# Patient Record
Sex: MALE | Race: WHITE | NOT HISPANIC OR LATINO | Employment: FULL TIME | URBAN - METROPOLITAN AREA
[De-identification: names, ages, dates, MRNs, and addresses within clinical notes are randomized per-mention and may not be internally consistent; named-entity substitution may affect disease eponyms.]

---

## 2018-05-29 ENCOUNTER — HOSPITAL ENCOUNTER (EMERGENCY)
Facility: MEDICAL CENTER | Age: 58
End: 2018-05-29
Attending: EMERGENCY MEDICINE
Payer: COMMERCIAL

## 2018-05-29 VITALS
OXYGEN SATURATION: 80 % | SYSTOLIC BLOOD PRESSURE: 130 MMHG | HEART RATE: 49 BPM | HEIGHT: 70 IN | TEMPERATURE: 98.8 F | RESPIRATION RATE: 15 BRPM | BODY MASS INDEX: 23.62 KG/M2 | DIASTOLIC BLOOD PRESSURE: 82 MMHG | WEIGHT: 165 LBS

## 2018-05-29 DIAGNOSIS — F10.920 ALCOHOLIC INTOXICATION WITHOUT COMPLICATION (HCC): ICD-10-CM

## 2018-05-29 LAB — POC BREATHALIZER: 0.29 PERCENT (ref 0–0.01)

## 2018-05-29 PROCEDURE — 302970 POC BREATHALIZER: Performed by: EMERGENCY MEDICINE

## 2018-05-29 PROCEDURE — 304561 HCHG STAT O2

## 2018-05-29 PROCEDURE — 99285 EMERGENCY DEPT VISIT HI MDM: CPT

## 2018-05-29 PROCEDURE — 700111 HCHG RX REV CODE 636 W/ 250 OVERRIDE (IP): Performed by: EMERGENCY MEDICINE

## 2018-05-29 PROCEDURE — 96374 THER/PROPH/DIAG INJ IV PUSH: CPT

## 2018-05-29 PROCEDURE — 302970 POC BREATHALIZER

## 2018-05-29 RX ORDER — ONDANSETRON 2 MG/ML
4 INJECTION INTRAMUSCULAR; INTRAVENOUS ONCE
Status: COMPLETED | OUTPATIENT
Start: 2018-05-29 | End: 2018-05-29

## 2018-05-29 RX ADMIN — ONDANSETRON 4 MG: 2 INJECTION INTRAMUSCULAR; INTRAVENOUS at 18:47

## 2018-05-29 ASSESSMENT — PAIN SCALES - GENERAL: PAINLEVEL_OUTOF10: ASSUMED PAIN PRESENT

## 2018-05-29 NOTE — ED TRIAGE NOTES
"Pt BIB [facility] with c/c of ETOH. Pt was found sleeping on a bench and EMS was called. Pt slow to respond to questions but oriented to date and place. Pt reporting he is from [place]. Pt denies medical complaints at this time. Pt reporting he normally doesn't consume large amounts of alcohol but \"Lost control\" pt stating \"last night I got fired\"  "

## 2018-05-29 NOTE — ED NOTES
Pt again attempting to get out of bed. Pt requesting water. Pt given water, pt again educated on fall risk precautions.

## 2018-05-29 NOTE — ED PROVIDER NOTES
"ED Provider Note    Scribed for Crystal Díaz M.D. by Alyssa Easton. 5/29/2018, 2:48 PM.    Means of arrival: ambulance  History obtained from: patient  History limited by: patient's altered level of consciousness(alcohol intoxication)    CHIEF COMPLAINT  Chief Complaint   Patient presents with   • Alcohol Intoxication       HPI  Artem Weinstein is a 57 y.o. male who presents to the Emergency Department for evaluation after being found intoxicated laying on a bench by EMS just prior to arrival in the ED. They decided to bring him to the ED secondary to him being slow to respond to questions. No medical complaints at this time. He reported to EMS and nursing staff \"I don't usually drink this much.\"  Denies suicidal ideation.    History limited secondary to patient's altered level of consciousness (alcohol intoxication)    REVIEW OF SYSTEMS  Pertinent positives include EtOH intoxication. Pertinent negatives include no medical complaints.  ROS limited secondary to patient's altered level of consciousness (EtOH intoxication)    PAST MEDICAL HISTORY   no pertinent past medical history    SURGICAL HISTORY  patient denies any surgical history    SOCIAL HISTORY  States that he does not drink on a regular basis    FAMILY HISTORY  None noted    CURRENT MEDICATIONS  No current facility-administered medications on file prior to encounter.      No current outpatient prescriptions on file prior to encounter.       ALLERGIES  No Known Allergies    PHYSICAL EXAM  VITAL SIGNS: /82   Pulse 95   Temp 37.1 °C (98.8 °F)   Resp 15   Ht 1.778 m (5' 10\")   Wt 74.8 kg (165 lb)   SpO2 95%   BMI 23.68 kg/m²     Constitutional: Alert in no apparent distress.  Intoxicated  HENT: No signs of trauma, Bilateral external ears normal, Nose normal.   Eyes: Pupils are equal and reactive, Conjunctiva normal, Non-icteric.   Neck: Normal range of motion, No tenderness, Supple, No stridor.   Cardiovascular: Regular rate and rhythm, " no murmurs.   Thorax & Lungs: Normal breath sounds, No respiratory distress, No wheezing, No chest tenderness.   Abdomen: Bowel sounds normal, Soft, No tenderness, No masses, No peritoneal signs.  Skin: Warm, Dry, No erythema, No rash.   Musculoskeletal:  No major deformities noted.   Neurologic: Alert, but somnolent easily arousable intoxicated no focal deficits    COURSE & MEDICAL DECISION MAKING  Pertinent Labs & Imaging studies reviewed. (See chart for details)    2:48 PM - Patient seen and examined at bedside. Patient is currently intoxicated. Will re-evaluate following some time for allowed sobriety. He has no medical complaints at this time and has normal vital signs.    6:40 PM I re-evaluated patient at bedside, he is much more awake and alert at this time. Reporting that he has been sober for the last year, until this evening, when he and his girlfriend ended their relationship. Patient denies suicidal thoughts at this time, and would like to go home. He is currently experiencing some nausea and vomiting, so 4mg Zofran will be administered prior to discharge.     The patient will return for new or worsening symptoms and is stable at the time of discharge. Patient was given return precautions. Patient and/or family member verbalizes understanding and will comply.    DISPOSITION:  Patient will be discharged home in stable condition.    FOLLOW UP:  [facility], Emergency Dept  [address] [address] [phone]    Return for worsening symptoms, vomiting or other concerns      OUTPATIENT MEDICATIONS:  There are no discharge medications for this patient.        FINAL IMPRESSION  1. Alcoholic intoxication without complication (HCC)         This dictation has been created using voice recognition software and/or scribes. The accuracy of the dictation is limited by the abilities of the software and the expertise of the scribes. I expect there may be some errors of grammar and  possibly content. I made every attempt to manually correct the errors within my dictation. However, errors related to voice recognition software and/or scribes may still exist and should be interpreted within the appropriate context.     I, Alyssa Easton (Scribe), am scribing for, and in the presence of, Crystal Díaz M.D..    Electronically signed by: Alyssa Easton (Scribe), 5/29/2018    ICrystal M.D. personally performed the services described in this documentation, as scribed by Alyssa Easton in my presence, and it is both accurate and complete.    The note accurately reflects work and decisions made by me.  Crystal Díaz  5/29/2018  9:04 PM

## 2018-05-29 NOTE — ED TRIAGE NOTES
ERP to bedside. [place] fall risk assessment completed and in chart. Interventions in place. Pt educated on fall risk many times.

## 2018-05-30 ENCOUNTER — HOSPITAL ENCOUNTER (EMERGENCY)
Facility: MEDICAL CENTER | Age: 58
End: 2018-05-30
Attending: EMERGENCY MEDICINE
Payer: COMMERCIAL

## 2018-05-30 VITALS
HEART RATE: 90 BPM | SYSTOLIC BLOOD PRESSURE: 142 MMHG | TEMPERATURE: 98.3 F | WEIGHT: 177.47 LBS | HEIGHT: 69 IN | OXYGEN SATURATION: 94 % | BODY MASS INDEX: 26.29 KG/M2 | RESPIRATION RATE: 21 BRPM | DIASTOLIC BLOOD PRESSURE: 70 MMHG

## 2018-05-30 DIAGNOSIS — R11.2 NON-INTRACTABLE VOMITING WITH NAUSEA, UNSPECIFIED VOMITING TYPE: ICD-10-CM

## 2018-05-30 DIAGNOSIS — K29.20 ACUTE ALCOHOLIC GASTRITIS WITHOUT HEMORRHAGE: ICD-10-CM

## 2018-05-30 LAB
ALBUMIN SERPL BCP-MCNC: 4.4 G/DL (ref 3.2–4.9)
ALBUMIN/GLOB SERPL: 1.9 G/DL
ALP SERPL-CCNC: 93 U/L (ref 30–99)
ALT SERPL-CCNC: 183 U/L (ref 2–50)
ANION GAP SERPL CALC-SCNC: 12 MMOL/L (ref 0–11.9)
AST SERPL-CCNC: 194 U/L (ref 12–45)
BASOPHILS # BLD AUTO: 0.5 % (ref 0–1.8)
BASOPHILS # BLD: 0.06 K/UL (ref 0–0.12)
BILIRUB SERPL-MCNC: 0.6 MG/DL (ref 0.1–1.5)
BUN SERPL-MCNC: 18 MG/DL (ref 8–22)
CALCIUM SERPL-MCNC: 8.6 MG/DL (ref 8.5–10.5)
CHLORIDE SERPL-SCNC: 94 MMOL/L (ref 96–112)
CO2 SERPL-SCNC: 22 MMOL/L (ref 20–33)
CREAT SERPL-MCNC: 0.89 MG/DL (ref 0.5–1.4)
EKG IMPRESSION: NORMAL
EOSINOPHIL # BLD AUTO: 0.03 K/UL (ref 0–0.51)
EOSINOPHIL NFR BLD: 0.3 % (ref 0–6.9)
ERYTHROCYTE [DISTWIDTH] IN BLOOD BY AUTOMATED COUNT: 38.5 FL (ref 35.9–50)
ETHANOL BLD-MCNC: 0.17 G/DL
GLOBULIN SER CALC-MCNC: 2.3 G/DL (ref 1.9–3.5)
GLUCOSE SERPL-MCNC: 122 MG/DL (ref 65–99)
HCT VFR BLD AUTO: 46.8 % (ref 42–52)
HGB BLD-MCNC: 16.2 G/DL (ref 14–18)
IMM GRANULOCYTES # BLD AUTO: 0.03 K/UL (ref 0–0.11)
IMM GRANULOCYTES NFR BLD AUTO: 0.3 % (ref 0–0.9)
LIPASE SERPL-CCNC: 30 U/L (ref 11–82)
LYMPHOCYTES # BLD AUTO: 1.89 K/UL (ref 1–4.8)
LYMPHOCYTES NFR BLD: 16.5 % (ref 22–41)
MAGNESIUM SERPL-MCNC: 1.6 MG/DL (ref 1.5–2.5)
MCH RBC QN AUTO: 29.5 PG (ref 27–33)
MCHC RBC AUTO-ENTMCNC: 34.6 G/DL (ref 33.7–35.3)
MCV RBC AUTO: 85.2 FL (ref 81.4–97.8)
MONOCYTES # BLD AUTO: 0.97 K/UL (ref 0–0.85)
MONOCYTES NFR BLD AUTO: 8.5 % (ref 0–13.4)
NEUTROPHILS # BLD AUTO: 8.46 K/UL (ref 1.82–7.42)
NEUTROPHILS NFR BLD: 73.9 % (ref 44–72)
NRBC # BLD AUTO: 0 K/UL
NRBC BLD-RTO: 0 /100 WBC
PHOSPHATE SERPL-MCNC: 2.7 MG/DL (ref 2.5–4.5)
PLATELET # BLD AUTO: 187 K/UL (ref 164–446)
PMV BLD AUTO: 9.4 FL (ref 9–12.9)
POTASSIUM SERPL-SCNC: 3.8 MMOL/L (ref 3.6–5.5)
PROT SERPL-MCNC: 6.7 G/DL (ref 6–8.2)
RBC # BLD AUTO: 5.49 M/UL (ref 4.7–6.1)
SODIUM SERPL-SCNC: 128 MMOL/L (ref 135–145)
WBC # BLD AUTO: 11.4 K/UL (ref 4.8–10.8)

## 2018-05-30 PROCEDURE — 304561 HCHG STAT O2

## 2018-05-30 PROCEDURE — 96376 TX/PRO/DX INJ SAME DRUG ADON: CPT

## 2018-05-30 PROCEDURE — 93005 ELECTROCARDIOGRAM TRACING: CPT | Performed by: EMERGENCY MEDICINE

## 2018-05-30 PROCEDURE — 36415 COLL VENOUS BLD VENIPUNCTURE: CPT

## 2018-05-30 PROCEDURE — 85025 COMPLETE CBC W/AUTO DIFF WBC: CPT

## 2018-05-30 PROCEDURE — 700105 HCHG RX REV CODE 258: Performed by: EMERGENCY MEDICINE

## 2018-05-30 PROCEDURE — 83735 ASSAY OF MAGNESIUM: CPT

## 2018-05-30 PROCEDURE — 84100 ASSAY OF PHOSPHORUS: CPT

## 2018-05-30 PROCEDURE — 80053 COMPREHEN METABOLIC PANEL: CPT

## 2018-05-30 PROCEDURE — 80307 DRUG TEST PRSMV CHEM ANLYZR: CPT

## 2018-05-30 PROCEDURE — 700111 HCHG RX REV CODE 636 W/ 250 OVERRIDE (IP): Performed by: EMERGENCY MEDICINE

## 2018-05-30 PROCEDURE — 83690 ASSAY OF LIPASE: CPT

## 2018-05-30 PROCEDURE — 96374 THER/PROPH/DIAG INJ IV PUSH: CPT

## 2018-05-30 PROCEDURE — 96361 HYDRATE IV INFUSION ADD-ON: CPT

## 2018-05-30 RX ORDER — FAMOTIDINE 20 MG/1
20 TABLET, FILM COATED ORAL 2 TIMES DAILY
Qty: 60 TAB | Refills: 0 | Status: SHIPPED | OUTPATIENT
Start: 2018-05-30 | End: 2021-09-14

## 2018-05-30 RX ORDER — ONDANSETRON 2 MG/ML
4 INJECTION INTRAMUSCULAR; INTRAVENOUS ONCE
Status: COMPLETED | OUTPATIENT
Start: 2018-05-30 | End: 2018-05-30

## 2018-05-30 RX ORDER — SODIUM CHLORIDE 9 MG/ML
1000 INJECTION, SOLUTION INTRAVENOUS ONCE
Status: COMPLETED | OUTPATIENT
Start: 2018-05-30 | End: 2018-05-30

## 2018-05-30 RX ORDER — ONDANSETRON 4 MG/1
4 TABLET, ORALLY DISINTEGRATING ORAL EVERY 6 HOURS PRN
Qty: 20 TAB | Refills: 0 | Status: SHIPPED | OUTPATIENT
Start: 2018-05-30 | End: 2021-09-14

## 2018-05-30 RX ADMIN — SODIUM CHLORIDE 1000 ML: 9 INJECTION, SOLUTION INTRAVENOUS at 00:44

## 2018-05-30 RX ADMIN — ONDANSETRON 4 MG: 2 INJECTION INTRAMUSCULAR; INTRAVENOUS at 00:43

## 2018-05-30 RX ADMIN — ONDANSETRON 4 MG: 2 INJECTION INTRAMUSCULAR; INTRAVENOUS at 03:18

## 2018-05-30 ASSESSMENT — ENCOUNTER SYMPTOMS
NAUSEA: 1
ABDOMINAL PAIN: 1
VOMITING: 1

## 2018-05-30 NOTE — ED NOTES
Pt denies drinking states he drank yesterday. Pt confused on date. Pt states its June 2. Pt is sunburnt. Pt c/o n/v, headache.

## 2018-05-30 NOTE — ED NOTES
Patient ambulated to restroom with steady gait. Patient not vomiting at this time. Patient up for re-eval.

## 2018-05-30 NOTE — ED NOTES
D/C home with written and verbal instructions re: Rx, activity, f/u.  Verbalizes understanding.  Ambulated out with steady gait. No assistance needed.

## 2018-05-30 NOTE — ED PROVIDER NOTES
ED Provider Note    CHIEF COMPLAINT  Chief Complaint   Patient presents with   • N/V     pt states he feel sick as a dog. Pt denies drinking alcohol. Breathalizer 0.289 in triage. pt has abnormal gait appears intoxicated.        HPI  Artem Weinstein is a 57 y.o. male who presents with nausea and vomiting.  Patient with history of alcohol abuse and diabetes, he reports that he recently relapsed and drank a considerable amount of alcohol.  She was seen here at 2:00 yesterday afternoon for alcohol intoxication and when clinically sober was discharged home.  he reports that he is very nauseous and has vomited multiple times since leaving the emergency department.  he denies any associated chest pain or shortness of breath.  He reports mild epigastric pain.  He denies any auditory or visual hallucinations.  he denies any tremor.  He denies any hematemesis, melena or hematochezia.  He denies any abdominal pain otherwise aside from the epigastric pain.  He denies any associated weakness or numbness or changes in vision.     REVIEW OF SYSTEMS  Review of Systems   Gastrointestinal: Positive for abdominal pain, nausea and vomiting.   All other systems reviewed and are negative.    See HPI for further details. All other systems are negative.     PAST MEDICAL HISTORY       SOCIAL HISTORY  Social History     Social History Main Topics   • Smoking status: Not on file   • Smokeless tobacco: Not on file   • Alcohol use Not on file   • Drug use: Unknown   • Sexual activity: Not on file       SURGICAL HISTORY  patient denies any surgical history    CURRENT MEDICATIONS  Home Medications    **Home medications have not yet been reviewed for this encounter**         ALLERGIES  No Known Allergies    PHYSICAL EXAM  Physical Exam   Constitutional: He is oriented to person, place, and time. He appears well-developed and well-nourished.   HENT:   Head: Normocephalic.   Eyes: Conjunctivae are normal. Pupils are equal, round, and  reactive to light.   Neck: Normal range of motion. Neck supple.   Cardiovascular: Regular rhythm.    Tachycardic   Pulmonary/Chest: Effort normal and breath sounds normal.   Abdominal: Soft. Bowel sounds are normal. He exhibits no distension. There is no rebound and no guarding.   Mild epigastric tenderness to palpation   Neurological: He is alert and oriented to person, place, and time.         COURSE & MEDICAL DECISION MAKING  Pertinent Labs & Imaging studies reviewed. (See chart for details)  Patient with likely alcoholic gastritis. Questionable pancreatitis. Patient without any associated chest pain or shortness of breath or cardiopulmonary symptoms but given his age we will check an EKG and screening labs however I believe that ACS is highly unlikely.  IV fluids given for patient's tachycardia secondary to dehydration secondary to his nausea and vomiting  Zofran given for patient's nausea  Patient's laboratory results are unremarkable. Patient's tachycardia has improved following IV fluids  Patient has not vomited in the emergency department following Zofran he has been able to tolerate po without difficulty. Patient was discharged home with diagnosis of alcoholic gastritis home with Pepcid and Zofran. Return precautions discussed    The patient will return for worsening symptoms and is stable at the time of discharge. The patient verbalizes understanding and will comply.    FINAL IMPRESSION  1. Alcohol gastritis, nausea vomiting related to alcohol         Electronically signed by: Brando Trejo, 5/30/2018 12:39 AM

## 2018-05-30 NOTE — ED TRIAGE NOTES
"Artem Weinstein  57 y.o.  /70   Pulse (!) 106   Temp 36.8 °C (98.3 °F)   Resp 18   Ht 1.753 m (5' 9\")   Wt 80.5 kg (177 lb 7.5 oz)   SpO2 93%   BMI 26.21 kg/m²   Chief Complaint   Patient presents with   • N/V     pt states he feel sick as a dog. Pt denies drinking alcohol. Breathalizer 0.289 in triage. pt has abnormal gait appears intoxicated.      Pt has red face, breathing heavy, shifts weight from side complains of stomach pains. states he has not had any alcohol today. Pt has positive breathalyzer in triage. Pt denies dx hx of Autobrewery syndrome. Pt returned to [facility], educated on triage process and wait times, instructed to notify any staff of worsening/changing of symptoms.     "

## 2018-05-30 NOTE — ED NOTES
"Pt medicated with zofran prior to dc per verbal order for nausea. IV removed intact. Pt educated on all discharge instructions, rx and follow up. Pt offered resources for detox pt denied, stated \"I go to AA I dont need anything else.\" Pt verbalized understanding.   "

## 2018-05-30 NOTE — ED NOTES
Pt now a&ox4. Slightly unsteady on his feet. Pt reporting he was sober for approx 1 year until today. Pt stating his girlfriend broke up with him. Pt denies job troubles now. Pt denies SI.

## 2018-05-30 NOTE — DISCHARGE INSTRUCTIONS
Alcoholic Gastritis  You have alcoholic gastritis. This is an inflammation of the lining of the stomach. It is caused by drinking alcohol. The symptoms may include: burning abdominal pain, nausea, vomiting or even vomiting blood. It can be made worse by a poor diet. People who drink frequently often do not eat well. Taking aspirin or other anti-inflammatory medications increases stomach irritation and bleeding. These medicines should be avoided.  Treatment is aimed at the cause. You have to stop drinking alcohol if you want to get better. Eat a healthy, well-balanced diet. You may take liquid antacids as needed. Your caregiver may prescribe medications to help heal the stomach lining. Take these as prescribed.  PREVENTION   · Anyone who has experienced alcoholic gastritis should consider that alcoholism may be an issue. Professional evaluation is highly recommended.   · Although some people can recover without help, most need assistance. With treatment and support, many are able to stop drinking and rebuild their lives. Long-term recovery is possible.   · Alcohol Addiction cannot be cured, but it can be treated successfully. Treatment centers are listed in telephone listings under:   · Alcoholism and Addiction Treatment; Substance Abuse Treatment or Cocaine, Narcotics and Alcoholics Anonymous. Most hospitals and clinics can refer you to a specialized care center.   · The [place] government maintains a toll-free number for treatment referrals: 1-223.981.2765 or 1-406.979.1870 (TDD). They also maintain a website: http://findtreatment.samhsa.gov. Other websites for more information are: www.mentalhealth.samhsa.gov and www.[place].gov.   · In [place], treatment resources are listed in each province. Listings are available under:   · [employer] or similar titles.   SEEK IMMEDIATE MEDICAL CARE IF:   · You develop severe abdominal pain, uncontrolled vomiting, or vomiting blood.   · You blackout or have  fainting spells.   · You develop seizures (this could be life threatening).   · You develop bloody stools or stools that appear black or tarry.   Document Released: 01/25/2006 Document Revised: 03/11/2013 Document Reviewed: 12/22/2010  [employer]® Patient Information ©2013 [place].

## 2018-05-30 NOTE — DISCHARGE INSTRUCTIONS
Alcohol Problems  Most adults who drink alcohol drink in moderation (not a lot) are at low risk for developing problems related to their drinking. However, all drinkers, including low-risk drinkers, should know about the health risks connected with drinking alcohol.  RECOMMENDATIONS FOR LOW-RISK DRINKING   Drink in moderation. Moderate drinking is defined as follows:   · Men - no more than 2 drinks per day.  · Nonpregnant women - no more than 1 drink per day.  · Over age 65 - no more than 1 drink per day.  A standard drink is 12 grams of pure alcohol, which is equal to a 12 ounce bottle of beer or wine cooler, a 5 ounce glass of wine, or 1.5 ounces of distilled spirits (such as whiskey, sarah, vodka, or rum).   ABSTAIN FROM (DO NOT DRINK) ALCOHOL:  · When pregnant or considering pregnancy.  · When taking a medication that interacts with alcohol.  · If you are alcohol dependent.  · A medical condition that prohibits drinking alcohol (such as ulcer, liver disease, or heart disease).  DISCUSS WITH YOUR CAREGIVER:  · If you are at risk for coronary heart disease, discuss the potential benefits and risks of alcohol use: Light to moderate drinking is associated with lower rates of coronary heart disease in certain populations (for example, men over age 45 and postmenopausal women). Infrequent or nondrinkers are advised not to begin light to moderate drinking to reduce the risk of coronary heart disease so as to avoid creating an alcohol-related problem. Similar protective effects can likely be gained through proper diet and exercise.  · Women and the elderly have smaller amounts of body water than men. As a result women and the elderly achieve a higher blood alcohol concentration after drinking the same amount of alcohol.  · Exposing a fetus to alcohol can cause a broad range of birth defects referred to as Fetal Alcohol Syndrome (FAS) or Alcohol-Related Birth Defects (ARBD). Although FAS/ARBD is connected with excessive  alcohol consumption during pregnancy, studies also have reported neurobehavioral problems in infants born to mothers reporting drinking an average of 1 drink per day during pregnancy.  · Heavier drinking (the consumption of more than 4 drinks per occasion by men and more than 3 drinks per occasion by women) impairs learning (cognitive) and psychomotor functions and increases the risk of alcohol-related problems, including accidents and injuries.  CAGE QUESTIONS:   · Have you ever felt that you should Cut down on your drinking?  · Have people Annoyed you by criticizing your drinking?  · Have you ever felt bad or Guilty about your drinking?  · Have you ever had a drink first thing in the morning to steady your nerves or get rid of a hangover (Eye opener)?  If you answered positively to any of these questions: You may be at risk for alcohol-related problems if alcohol consumption is:   · Men: Greater than 14 drinks per week or more than 4 drinks per occasion.  · Women: Greater than 7 drinks per week or more than 3 drinks per occasion.  Do you or your family have a medical history of alcohol-related problems, such as:  · Blackouts.  · Sexual dysfunction.  · Depression.  · Trauma.  · Liver dysfunction.  · Sleep disorders.  · Hypertension.  · Chronic abdominal pain.  · Has your drinking ever caused you problems, such as problems with your family, problems with your work (or school) performance, or accidents/injuries?  · Do you have a compulsion to drink or a preoccupation with drinking?  · Do you have poor control or are you unable to stop drinking once you have started?  · Do you have to drink to avoid withdrawal symptoms?  · Do you have problems with withdrawal such as tremors, nausea, sweats, or mood disturbances?  · Does it take more alcohol than in the past to get you high?  · Do you feel a strong urge to drink?  · Do you change your plans so that you can have a drink?  · Do you ever drink in the morning to relieve  the shakes or a hangover?  If you have answered a number of the previous questions positively, it may be time for you to talk to your caregivers, family, and friends and see if they think you have a problem. Alcoholism is a chemical dependency that keeps getting worse and will eventually destroy your health and relationships. Many alcoholics end up dead, impoverished, or in residential. This is often the end result of all chemical dependency.  · Do not be discouraged if you are not ready to take action immediately.  · Decisions to change behavior often involve up and down desires to change and feeling like you cannot decide.  · Try to think more seriously about your drinking behavior.  · Think of the reasons to quit.  WHERE TO GO FOR ADDITIONAL INFORMATION   · The National [place] on Alcohol Abuse and Alcoholism (NIAAA)  www.niaaa.nih.gov  · National [place] on Alcoholism and Drug Dependence (NCADD)  www.ncadd.org  · American Society of Addiction Medicine (ASAM)  www.asam.org   Document Released: 12/18/2006 Document Revised: 03/11/2013 Document Reviewed: 08/05/2009  [employer]® Patient Information ©2014 [employer], [facility].

## 2019-11-14 ENCOUNTER — HOSPITAL ENCOUNTER (EMERGENCY)
Facility: MEDICAL CENTER | Age: 59
End: 2019-11-14
Attending: EMERGENCY MEDICINE
Payer: COMMERCIAL

## 2019-11-14 VITALS
DIASTOLIC BLOOD PRESSURE: 75 MMHG | SYSTOLIC BLOOD PRESSURE: 120 MMHG | RESPIRATION RATE: 16 BRPM | HEART RATE: 75 BPM | HEIGHT: 70 IN | OXYGEN SATURATION: 96 % | WEIGHT: 160 LBS | BODY MASS INDEX: 22.9 KG/M2 | TEMPERATURE: 97.3 F

## 2019-11-14 DIAGNOSIS — K70.10 ALCOHOLIC HEPATITIS WITHOUT ASCITES: ICD-10-CM

## 2019-11-14 DIAGNOSIS — F10.929 ALCOHOLIC INTOXICATION WITH COMPLICATION (HCC): ICD-10-CM

## 2019-11-14 LAB
ALBUMIN SERPL BCP-MCNC: 4.5 G/DL (ref 3.2–4.9)
ALBUMIN/GLOB SERPL: 1.5 G/DL
ALP SERPL-CCNC: 125 U/L (ref 30–99)
ALT SERPL-CCNC: 189 U/L (ref 2–50)
ANION GAP SERPL CALC-SCNC: 14 MMOL/L (ref 0–11.9)
ANION GAP SERPL CALC-SCNC: 15 MMOL/L (ref 0–11.9)
ANION GAP SERPL CALC-SCNC: 23 MMOL/L (ref 0–11.9)
AST SERPL-CCNC: 258 U/L (ref 12–45)
BASOPHILS # BLD AUTO: 0.3 % (ref 0–1.8)
BASOPHILS # BLD: 0.05 K/UL (ref 0–0.12)
BILIRUB SERPL-MCNC: 0.5 MG/DL (ref 0.1–1.5)
BUN SERPL-MCNC: 22 MG/DL (ref 8–22)
BUN SERPL-MCNC: 25 MG/DL (ref 8–22)
BUN SERPL-MCNC: 33 MG/DL (ref 8–22)
CALCIUM SERPL-MCNC: 5.7 MG/DL (ref 8.5–10.5)
CALCIUM SERPL-MCNC: 7.3 MG/DL (ref 8.5–10.5)
CALCIUM SERPL-MCNC: 8.6 MG/DL (ref 8.5–10.5)
CHLORIDE SERPL-SCNC: 106 MMOL/L (ref 96–112)
CHLORIDE SERPL-SCNC: 90 MMOL/L (ref 96–112)
CHLORIDE SERPL-SCNC: 97 MMOL/L (ref 96–112)
CO2 SERPL-SCNC: 20 MMOL/L (ref 20–33)
CO2 SERPL-SCNC: 21 MMOL/L (ref 20–33)
CO2 SERPL-SCNC: 25 MMOL/L (ref 20–33)
CREAT SERPL-MCNC: 0.56 MG/DL (ref 0.5–1.4)
CREAT SERPL-MCNC: 0.7 MG/DL (ref 0.5–1.4)
CREAT SERPL-MCNC: 0.95 MG/DL (ref 0.5–1.4)
EOSINOPHIL # BLD AUTO: 0 K/UL (ref 0–0.51)
EOSINOPHIL NFR BLD: 0 % (ref 0–6.9)
ERYTHROCYTE [DISTWIDTH] IN BLOOD BY AUTOMATED COUNT: 42.1 FL (ref 35.9–50)
GLOBULIN SER CALC-MCNC: 3 G/DL (ref 1.9–3.5)
GLUCOSE SERPL-MCNC: 155 MG/DL (ref 65–99)
GLUCOSE SERPL-MCNC: 171 MG/DL (ref 65–99)
GLUCOSE SERPL-MCNC: 224 MG/DL (ref 65–99)
HCT VFR BLD AUTO: 52.3 % (ref 42–52)
HGB BLD-MCNC: 17.9 G/DL (ref 14–18)
IMM GRANULOCYTES # BLD AUTO: 0.09 K/UL (ref 0–0.11)
IMM GRANULOCYTES NFR BLD AUTO: 0.6 % (ref 0–0.9)
LYMPHOCYTES # BLD AUTO: 1.01 K/UL (ref 1–4.8)
LYMPHOCYTES NFR BLD: 6.5 % (ref 22–41)
MCH RBC QN AUTO: 30.9 PG (ref 27–33)
MCHC RBC AUTO-ENTMCNC: 34.2 G/DL (ref 33.7–35.3)
MCV RBC AUTO: 90.2 FL (ref 81.4–97.8)
MONOCYTES # BLD AUTO: 0.93 K/UL (ref 0–0.85)
MONOCYTES NFR BLD AUTO: 6 % (ref 0–13.4)
NEUTROPHILS # BLD AUTO: 13.47 K/UL (ref 1.82–7.42)
NEUTROPHILS NFR BLD: 86.6 % (ref 44–72)
NRBC # BLD AUTO: 0 K/UL
NRBC BLD-RTO: 0 /100 WBC
PLATELET # BLD AUTO: 245 K/UL (ref 164–446)
PMV BLD AUTO: 9.3 FL (ref 9–12.9)
POTASSIUM SERPL-SCNC: 3.1 MMOL/L (ref 3.6–5.5)
POTASSIUM SERPL-SCNC: 3.9 MMOL/L (ref 3.6–5.5)
POTASSIUM SERPL-SCNC: 4.3 MMOL/L (ref 3.6–5.5)
PROT SERPL-MCNC: 7.5 G/DL (ref 6–8.2)
RBC # BLD AUTO: 5.8 M/UL (ref 4.7–6.1)
SODIUM SERPL-SCNC: 134 MMOL/L (ref 135–145)
SODIUM SERPL-SCNC: 137 MMOL/L (ref 135–145)
SODIUM SERPL-SCNC: 140 MMOL/L (ref 135–145)
WBC # BLD AUTO: 15.6 K/UL (ref 4.8–10.8)

## 2019-11-14 PROCEDURE — 80048 BASIC METABOLIC PNL TOTAL CA: CPT

## 2019-11-14 PROCEDURE — 99285 EMERGENCY DEPT VISIT HI MDM: CPT

## 2019-11-14 PROCEDURE — 85025 COMPLETE CBC W/AUTO DIFF WBC: CPT

## 2019-11-14 PROCEDURE — 80053 COMPREHEN METABOLIC PANEL: CPT

## 2019-11-14 PROCEDURE — 700105 HCHG RX REV CODE 258: Performed by: EMERGENCY MEDICINE

## 2019-11-14 RX ORDER — SODIUM CHLORIDE 9 MG/ML
2000 INJECTION, SOLUTION INTRAVENOUS ONCE
Status: COMPLETED | OUTPATIENT
Start: 2019-11-14 | End: 2019-11-14

## 2019-11-14 RX ADMIN — SODIUM CHLORIDE 2000 ML: 9 INJECTION, SOLUTION INTRAVENOUS at 04:11

## 2019-11-14 SDOH — HEALTH STABILITY: MENTAL HEALTH: HOW OFTEN DO YOU HAVE A DRINK CONTAINING ALCOHOL?: 4 OR MORE TIMES A WEEK

## 2019-11-14 ASSESSMENT — LIFESTYLE VARIABLES: DO YOU DRINK ALCOHOL: YES

## 2019-11-14 NOTE — ED NOTES
Pending remaining lab work. CMP recollected  - clotted per lab. Patient sleeping at this time. Equal chest rise and fall noted.

## 2019-11-14 NOTE — DISCHARGE INSTRUCTIONS
Alcohol Problems  Most adults who drink alcohol drink in moderation (not a lot) are at low risk for developing problems related to their drinking. However, all drinkers, including low-risk drinkers, should know about the health risks connected with drinking alcohol.  RECOMMENDATIONS FOR LOW-RISK DRINKING   Drink in moderation. Moderate drinking is defined as follows:   · Men - no more than 2 drinks per day.  · Nonpregnant women - no more than 1 drink per day.  · Over age 65 - no more than 1 drink per day.  A standard drink is 12 grams of pure alcohol, which is equal to a 12 ounce bottle of beer or wine cooler, a 5 ounce glass of wine, or 1.5 ounces of distilled spirits (such as whiskey, sarah, vodka, or rum).   ABSTAIN FROM (DO NOT DRINK) ALCOHOL:  · When pregnant or considering pregnancy.  · When taking a medication that interacts with alcohol.  · If you are alcohol dependent.  · A medical condition that prohibits drinking alcohol (such as ulcer, liver disease, or heart disease).  DISCUSS WITH YOUR CAREGIVER:  · If you are at risk for coronary heart disease, discuss the potential benefits and risks of alcohol use: Light to moderate drinking is associated with lower rates of coronary heart disease in certain populations (for example, men over age 45 and postmenopausal women). Infrequent or nondrinkers are advised not to begin light to moderate drinking to reduce the risk of coronary heart disease so as to avoid creating an alcohol-related problem. Similar protective effects can likely be gained through proper diet and exercise.  · Women and the elderly have smaller amounts of body water than men. As a result women and the elderly achieve a higher blood alcohol concentration after drinking the same amount of alcohol.  · Exposing a fetus to alcohol can cause a broad range of birth defects referred to as Fetal Alcohol Syndrome (FAS) or Alcohol-Related Birth Defects (ARBD). Although FAS/ARBD is connected with excessive  alcohol consumption during pregnancy, studies also have reported neurobehavioral problems in infants born to mothers reporting drinking an average of 1 drink per day during pregnancy.  · Heavier drinking (the consumption of more than 4 drinks per occasion by men and more than 3 drinks per occasion by women) impairs learning (cognitive) and psychomotor functions and increases the risk of alcohol-related problems, including accidents and injuries.  CAGE QUESTIONS:   · Have you ever felt that you should Cut down on your drinking?  · Have people Annoyed you by criticizing your drinking?  · Have you ever felt bad or Guilty about your drinking?  · Have you ever had a drink first thing in the morning to steady your nerves or get rid of a hangover (Eye opener)?  If you answered positively to any of these questions: You may be at risk for alcohol-related problems if alcohol consumption is:   · Men: Greater than 14 drinks per week or more than 4 drinks per occasion.  · Women: Greater than 7 drinks per week or more than 3 drinks per occasion.  Do you or your family have a medical history of alcohol-related problems, such as:  · Blackouts.  · Sexual dysfunction.  · Depression.  · Trauma.  · Liver dysfunction.  · Sleep disorders.  · Hypertension.  · Chronic abdominal pain.  · Has your drinking ever caused you problems, such as problems with your family, problems with your work (or school) performance, or accidents/injuries?  · Do you have a compulsion to drink or a preoccupation with drinking?  · Do you have poor control or are you unable to stop drinking once you have started?  · Do you have to drink to avoid withdrawal symptoms?  · Do you have problems with withdrawal such as tremors, nausea, sweats, or mood disturbances?  · Does it take more alcohol than in the past to get you high?  · Do you feel a strong urge to drink?  · Do you change your plans so that you can have a drink?  · Do you ever drink in the morning to relieve  the shakes or a hangover?  If you have answered a number of the previous questions positively, it may be time for you to talk to your caregivers, family, and friends and see if they think you have a problem. Alcoholism is a chemical dependency that keeps getting worse and will eventually destroy your health and relationships. Many alcoholics end up dead, impoverished, or in long term. This is often the end result of all chemical dependency.  · Do not be discouraged if you are not ready to take action immediately.  · Decisions to change behavior often involve up and down desires to change and feeling like you cannot decide.  · Try to think more seriously about your drinking behavior.  · Think of the reasons to quit.  WHERE TO GO FOR ADDITIONAL INFORMATION   · The National [place] on Alcohol Abuse and Alcoholism (NIAAA)  www.niaaa.nih.gov  · National [place] on Alcoholism and Drug Dependence (NCADD)  www.ncadd.org  · American Society of Addiction Medicine (ASAM)  www.asam.org   Document Released: 12/18/2006 Document Revised: 03/11/2013 Document Reviewed: 08/05/2009  [employer]Care® Patient Information ©2014 [employer], [facility].

## 2019-11-14 NOTE — ED TRIAGE NOTES
"Chief Complaint   Patient presents with   • Alcohol Intoxication   • ALOC     [place]. Admits to copious EtOH tonight     /67   Pulse 79   Temp 36.1 °C (97 °F) (Temporal) Comment (Src): Uncooperative for oral temp  Resp 18   Ht 1.778 m (5' 10\")   Wt 72.6 kg (160 lb)   SpO2 94%   BMI 22.96 kg/m²     58 y/o male presents to EMS after he was found down and altered outside the [facility] tonight by a bystander. Patient admits to copious amounts of [place] tonight.  He denies any physical complaints at this time, but was noted to have an abrasion to his left elbow and a blood glucose of 291 - (known hx diabetes).      A/oX3, GCS on arrival   "

## 2019-11-14 NOTE — ED NOTES
Patient awake, alert and in no distress. VSS. Ambulates well, but still mildly unsteady, therefore patient will remain in ED until deemed steady enough to walk out of department. Provided with warm cup of coffee.  Report to DARNELL Nicholson.

## 2019-11-14 NOTE — ED NOTES
Report from Amadou PATRICK, assumed care of pt.     Per MD Jimenez pt needs to wait for d/c as pt is unsteady on feet. Pt given coffee. Updated on poc, acknowledged. No needs at this time.

## 2019-11-14 NOTE — ED PROVIDER NOTES
"ED Provider Note    ED Provider Note      Primary care provider: No primary care provider on file.    CHIEF COMPLAINT  Chief Complaint   Patient presents with   • Alcohol Intoxication   • ALOC     Found by bystander outside of [place] tonight. Admits to copious EtOH tonight       HPI  Artem Weinstein is a 59 y.o. male who presents to the Emergency Department with chief complaint of alcohol intoxication.  EMS was called to this of her [facility] patient reportedly was laying outside.  Upon arrival here patient states that he drank a large amount of Martin Mustafa.  He denies other ingestions at this time he denies any trauma he is otherwise noncompliant with examination limited by altered mental status.    REVIEW OF SYSTEMS  As per HPI otherwise limited by altered mental status    PAST MEDICAL HISTORY   has a past medical history of Diabetes (HCC).    SURGICAL HISTORY  patient denies any surgical history    SOCIAL HISTORY  Social History     Tobacco Use   • Smoking status: Current Every Day Smoker     Packs/day: 0.00     Types: Cigarettes   • Smokeless tobacco: Never Used   Substance Use Topics   • Alcohol use: Yes     Frequency: 4 or more times a week   • Drug use: No      Social History     Substance and Sexual Activity   Drug Use No       FAMILY HISTORY  Non-Contributory    CURRENT MEDICATIONS  Home Medications    **Home medications have not yet been reviewed for this encounter**         ALLERGIES  No Known Allergies    PHYSICAL EXAM  VITAL SIGNS: /67   Pulse 79   Temp 36.1 °C (97 °F) (Temporal) Comment (Src): Uncooperative for oral temp  Resp 18   Ht 1.778 m (5' 10\")   Wt 72.6 kg (160 lb)   SpO2 94%   BMI 22.96 kg/m²   Pulse ox interpretation: I interpret this pulse ox as normal.  Constitutional: Somnolent difficult to arouse minimal distress  HEENT: Atraumatic normocephalic, pupils are equal round reactive to light extraocular movements are intact. The nares is clear, external ears are normal, " mouth shows moist mucous membranes  Neck: Supple, no JVD no tracheal deviation  Cardiovascular: Regular rate and rhythm no murmur rub or gallop 2+ pulses peripherally x4  Thorax & Lungs: No respiratory distress, no wheezes rales or rhonchi, No chest tenderness.   GI: Soft nontender nondistended positive bowel sounds, no peritoneal signs  : Deferred  Rectal: Deferred  Skin: Warm dry no acute rash or lesion  Musculoskeletal: Moving all extremities with full range and 5 of 5 strength, no acute  deformity  Neurologic: Cranial nerves III through XII are grossly intact, no sensory deficit, no cerebellar dysfunction   Psychiatric: Appropriate affect for situation at this time      DIAGNOSTIC STUDIES / PROCEDURES  LABS      Results for orders placed or performed during the hospital encounter of 11/14/19   CBC WITH DIFFERENTIAL   Result Value Ref Range    WBC 15.6 (H) 4.8 - 10.8 K/uL    RBC 5.80 4.70 - 6.10 M/uL    Hemoglobin 17.9 14.0 - 18.0 g/dL    Hematocrit 52.3 (H) 42.0 - 52.0 %    MCV 90.2 81.4 - 97.8 fL    MCH 30.9 27.0 - 33.0 pg    MCHC 34.2 33.7 - 35.3 g/dL    RDW 42.1 35.9 - 50.0 fL    Platelet Count 245 164 - 446 K/uL    MPV 9.3 9.0 - 12.9 fL    Neutrophils-Polys 86.60 (H) 44.00 - 72.00 %    Lymphocytes 6.50 (L) 22.00 - 41.00 %    Monocytes 6.00 0.00 - 13.40 %    Eosinophils 0.00 0.00 - 6.90 %    Basophils 0.30 0.00 - 1.80 %    Immature Granulocytes 0.60 0.00 - 0.90 %    Nucleated RBC 0.00 /100 WBC    Neutrophils (Absolute) 13.47 (H) 1.82 - 7.42 K/uL    Lymphs (Absolute) 1.01 1.00 - 4.80 K/uL    Monos (Absolute) 0.93 (H) 0.00 - 0.85 K/uL    Eos (Absolute) 0.00 0.00 - 0.51 K/uL    Baso (Absolute) 0.05 0.00 - 0.12 K/uL    Immature Granulocytes (abs) 0.09 0.00 - 0.11 K/uL    NRBC (Absolute) 0.00 K/uL   Comp Metabolic Panel   Result Value Ref Range    Sodium 134 (L) 135 - 145 mmol/L    Potassium 4.3 3.6 - 5.5 mmol/L    Chloride 90 (L) 96 - 112 mmol/L    Co2 21 20 - 33 mmol/L    Anion Gap 23.0 (H) 0.0 - 11.9     Glucose 224 (H) 65 - 99 mg/dL    Bun 33 (H) 8 - 22 mg/dL    Creatinine 0.95 0.50 - 1.40 mg/dL    Calcium 8.6 8.5 - 10.5 mg/dL    AST(SGOT) 258 (H) 12 - 45 U/L    ALT(SGPT) 189 (H) 2 - 50 U/L    Alkaline Phosphatase 125 (H) 30 - 99 U/L    Total Bilirubin 0.5 0.1 - 1.5 mg/dL    Albumin 4.5 3.2 - 4.9 g/dL    Total Protein 7.5 6.0 - 8.2 g/dL    Globulin 3.0 1.9 - 3.5 g/dL    A-G Ratio 1.5 g/dL   ESTIMATED GFR   Result Value Ref Range    GFR If African American >60 >60 mL/min/1.73 m 2    GFR If Non African American >60 >60 mL/min/1.73 m 2   Basic Metabolic Panel   Result Value Ref Range    Sodium 140 135 - 145 mmol/L    Potassium 3.1 (L) 3.6 - 5.5 mmol/L    Chloride 106 96 - 112 mmol/L    Co2 20 20 - 33 mmol/L    Glucose 155 (H) 65 - 99 mg/dL    Bun 22 8 - 22 mg/dL    Creatinine 0.56 0.50 - 1.40 mg/dL    Calcium 5.7 (LL) 8.5 - 10.5 mg/dL    Anion Gap 14.0 (H) 0.0 - 11.9   ESTIMATED GFR   Result Value Ref Range    GFR If African American >60 >60 mL/min/1.73 m 2    GFR If Non African American >60 >60 mL/min/1.73 m 2   Basic Metabolic Panel   Result Value Ref Range    Sodium 137 135 - 145 mmol/L    Potassium 3.9 3.6 - 5.5 mmol/L    Chloride 97 96 - 112 mmol/L    Co2 25 20 - 33 mmol/L    Glucose 171 (H) 65 - 99 mg/dL    Bun 25 (H) 8 - 22 mg/dL    Creatinine 0.70 0.50 - 1.40 mg/dL    Calcium 7.3 (L) 8.5 - 10.5 mg/dL    Anion Gap 15.0 (H) 0.0 - 11.9   ESTIMATED GFR   Result Value Ref Range    GFR If African American >60 >60 mL/min/1.73 m 2    GFR If Non African American >60 >60 mL/min/1.73 m 2       All labs reviewed by me.      RADIOLOGY  No orders to display     The radiologist's interpretation of all radiological studies have been reviewed by me.    COURSE & MEDICAL DECISION MAKING  Pertinent Labs & Imaging studies reviewed. (See chart for details)    1:02 AM - Patient seen and examined at bedside.         Patient noted to have slightly elevated blood pressure likely circumstantial secondary to presenting complaint.  "Referred to primary care physician for further evaluation.     Patient was given IV fluids based on anion gap acidosis, oral hydration was not attempted due to insufficiency for hydration, after fluids had closing the gap and improvement of symptoms    Medical Decision Making: Labs improving vital signs improving patient still ambulating with some difficulty.  Will observe until stable ambulating tolerating p.o. intake and will be discharged at that time given instruction follow-up with primary care for further evaluation of his alcoholic hepatitis alcohol dependence otherwise discharged in improvement in stable condition.  Oncoming practitioner aware patient if there is any need for intervention otherwise patient will be discharged when clinically appropriate/stable.    /67   Pulse 79   Temp 36.1 °C (97 °F) (Temporal) Comment (Src): Uncooperative for oral temp  Resp 18   Ht 1.778 m (5' 10\")   Wt 72.6 kg (160 lb)   SpO2 94%   BMI 22.96 kg/m²     [address] [address] [phone]  Schedule an appointment as soon as possible for a visit   for establishment of primary care, for blood pressure management    [facility], Emergency Dept  [address] [facility] [address] [phone]    immediately if symptoms worsen          FINAL IMPRESSION  1. Alcoholic intoxication with complication (HCC) Active   2. Alcoholic hepatitis without ascites Active          This dictation has been created using voice recognition software and/or scribes. The accuracy of the dictation is limited by the abilities of the software and the expertise of the scribes. I expect there may be some errors of grammar and possibly content. I made every attempt to manually correct the errors within my dictation. However, errors related to voice recognition software and/or scribes may still exist and should be interpreted within the appropriate context.            "

## 2019-11-15 ENCOUNTER — APPOINTMENT (OUTPATIENT)
Dept: RADIOLOGY | Facility: MEDICAL CENTER | Age: 59
End: 2019-11-15
Attending: EMERGENCY MEDICINE
Payer: COMMERCIAL

## 2019-11-15 ENCOUNTER — HOSPITAL ENCOUNTER (EMERGENCY)
Facility: MEDICAL CENTER | Age: 59
End: 2019-11-15
Attending: EMERGENCY MEDICINE
Payer: COMMERCIAL

## 2019-11-15 VITALS
RESPIRATION RATE: 16 BRPM | WEIGHT: 160 LBS | OXYGEN SATURATION: 97 % | SYSTOLIC BLOOD PRESSURE: 134 MMHG | HEIGHT: 70 IN | BODY MASS INDEX: 22.9 KG/M2 | HEART RATE: 80 BPM | DIASTOLIC BLOOD PRESSURE: 70 MMHG

## 2019-11-15 DIAGNOSIS — R74.01 TRANSAMINITIS: ICD-10-CM

## 2019-11-15 DIAGNOSIS — F32.A DEPRESSION, UNSPECIFIED DEPRESSION TYPE: ICD-10-CM

## 2019-11-15 DIAGNOSIS — R07.9 CHEST PAIN, UNSPECIFIED TYPE: ICD-10-CM

## 2019-11-15 DIAGNOSIS — F10.920 ALCOHOLIC INTOXICATION WITHOUT COMPLICATION (HCC): ICD-10-CM

## 2019-11-15 LAB
ALBUMIN SERPL BCP-MCNC: 3.9 G/DL (ref 3.2–4.9)
ALBUMIN/GLOB SERPL: 1.6 G/DL
ALP SERPL-CCNC: 110 U/L (ref 30–99)
ALT SERPL-CCNC: 229 U/L (ref 2–50)
ANION GAP SERPL CALC-SCNC: 17 MMOL/L (ref 0–11.9)
AST SERPL-CCNC: 383 U/L (ref 12–45)
BASOPHILS # BLD AUTO: 0.2 % (ref 0–1.8)
BASOPHILS # BLD: 0.03 K/UL (ref 0–0.12)
BILIRUB SERPL-MCNC: 0.7 MG/DL (ref 0.1–1.5)
BUN SERPL-MCNC: 26 MG/DL (ref 8–22)
CALCIUM SERPL-MCNC: 8.1 MG/DL (ref 8.5–10.5)
CHLORIDE SERPL-SCNC: 92 MMOL/L (ref 96–112)
CO2 SERPL-SCNC: 25 MMOL/L (ref 20–33)
CREAT SERPL-MCNC: 0.82 MG/DL (ref 0.5–1.4)
EKG IMPRESSION: NORMAL
EOSINOPHIL # BLD AUTO: 0.01 K/UL (ref 0–0.51)
EOSINOPHIL NFR BLD: 0.1 % (ref 0–6.9)
ERYTHROCYTE [DISTWIDTH] IN BLOOD BY AUTOMATED COUNT: 42.2 FL (ref 35.9–50)
ETHANOL BLD-MCNC: 0.4 G/DL
GLOBULIN SER CALC-MCNC: 2.4 G/DL (ref 1.9–3.5)
GLUCOSE SERPL-MCNC: 195 MG/DL (ref 65–99)
HCT VFR BLD AUTO: 51.3 % (ref 42–52)
HGB BLD-MCNC: 17.1 G/DL (ref 14–18)
IMM GRANULOCYTES # BLD AUTO: 0.09 K/UL (ref 0–0.11)
IMM GRANULOCYTES NFR BLD AUTO: 0.7 % (ref 0–0.9)
LIPASE SERPL-CCNC: 81 U/L (ref 11–82)
LYMPHOCYTES # BLD AUTO: 1.09 K/UL (ref 1–4.8)
LYMPHOCYTES NFR BLD: 8.3 % (ref 22–41)
MCH RBC QN AUTO: 30 PG (ref 27–33)
MCHC RBC AUTO-ENTMCNC: 33.3 G/DL (ref 33.7–35.3)
MCV RBC AUTO: 90 FL (ref 81.4–97.8)
MONOCYTES # BLD AUTO: 0.72 K/UL (ref 0–0.85)
MONOCYTES NFR BLD AUTO: 5.5 % (ref 0–13.4)
NEUTROPHILS # BLD AUTO: 11.15 K/UL (ref 1.82–7.42)
NEUTROPHILS NFR BLD: 85.2 % (ref 44–72)
NRBC # BLD AUTO: 0 K/UL
NRBC BLD-RTO: 0 /100 WBC
PLATELET # BLD AUTO: 203 K/UL (ref 164–446)
PMV BLD AUTO: 9 FL (ref 9–12.9)
POC BREATHALIZER: 0.07 PERCENT (ref 0–0.01)
POC BREATHALIZER: 0.15 PERCENT (ref 0–0.01)
POTASSIUM SERPL-SCNC: 4 MMOL/L (ref 3.6–5.5)
PROT SERPL-MCNC: 6.3 G/DL (ref 6–8.2)
RBC # BLD AUTO: 5.7 M/UL (ref 4.7–6.1)
SODIUM SERPL-SCNC: 134 MMOL/L (ref 135–145)
TROPONIN T SERPL-MCNC: 11 NG/L (ref 6–19)
TROPONIN T SERPL-MCNC: 11 NG/L (ref 6–19)
TROPONIN T SERPL-MCNC: <6 NG/L (ref 6–19)
WBC # BLD AUTO: 13.1 K/UL (ref 4.8–10.8)

## 2019-11-15 PROCEDURE — 84484 ASSAY OF TROPONIN QUANT: CPT

## 2019-11-15 PROCEDURE — 700111 HCHG RX REV CODE 636 W/ 250 OVERRIDE (IP): Performed by: EMERGENCY MEDICINE

## 2019-11-15 PROCEDURE — 36415 COLL VENOUS BLD VENIPUNCTURE: CPT

## 2019-11-15 PROCEDURE — 93005 ELECTROCARDIOGRAM TRACING: CPT

## 2019-11-15 PROCEDURE — 83690 ASSAY OF LIPASE: CPT

## 2019-11-15 PROCEDURE — 302970 POC BREATHALIZER: Performed by: EMERGENCY MEDICINE

## 2019-11-15 PROCEDURE — 700102 HCHG RX REV CODE 250 W/ 637 OVERRIDE(OP): Performed by: EMERGENCY MEDICINE

## 2019-11-15 PROCEDURE — 76705 ECHO EXAM OF ABDOMEN: CPT

## 2019-11-15 PROCEDURE — A9270 NON-COVERED ITEM OR SERVICE: HCPCS | Performed by: EMERGENCY MEDICINE

## 2019-11-15 PROCEDURE — 96375 TX/PRO/DX INJ NEW DRUG ADDON: CPT

## 2019-11-15 PROCEDURE — 80053 COMPREHEN METABOLIC PANEL: CPT

## 2019-11-15 PROCEDURE — 93005 ELECTROCARDIOGRAM TRACING: CPT | Performed by: EMERGENCY MEDICINE

## 2019-11-15 PROCEDURE — 96374 THER/PROPH/DIAG INJ IV PUSH: CPT

## 2019-11-15 PROCEDURE — 71045 X-RAY EXAM CHEST 1 VIEW: CPT

## 2019-11-15 PROCEDURE — 99285 EMERGENCY DEPT VISIT HI MDM: CPT

## 2019-11-15 PROCEDURE — 96376 TX/PRO/DX INJ SAME DRUG ADON: CPT

## 2019-11-15 PROCEDURE — 80307 DRUG TEST PRSMV CHEM ANLYZR: CPT

## 2019-11-15 PROCEDURE — 85025 COMPLETE CBC W/AUTO DIFF WBC: CPT

## 2019-11-15 RX ORDER — LORAZEPAM 2 MG/ML
0.5 INJECTION INTRAMUSCULAR ONCE
Status: COMPLETED | OUTPATIENT
Start: 2019-11-15 | End: 2019-11-15

## 2019-11-15 RX ADMIN — LORAZEPAM 0.5 MG: 2 INJECTION INTRAMUSCULAR at 20:14

## 2019-11-15 RX ADMIN — LIDOCAINE HYDROCHLORIDE 30 ML: 20 SOLUTION OROPHARYNGEAL at 16:24

## 2019-11-15 RX ADMIN — FAMOTIDINE 20 MG: 10 INJECTION INTRAVENOUS at 16:24

## 2019-11-15 RX ADMIN — LORAZEPAM 0.5 MG: 2 INJECTION INTRAMUSCULAR at 21:46

## 2019-11-15 NOTE — ED TRIAGE NOTES
Chief Complaint   Patient presents with   • Chest Pain     left   • Shortness of Breath   • Alcohol Intoxication      Pt bib ems after pt reported cp/sob with etoh intoxication.  Pt reports his pain is constant and left sided.  Pt is a poor historian at this time, however he reports a possible hx of heart murmur and valve issues.      PIV placed, protocol initiated. ERP to see.

## 2019-11-15 NOTE — DISCHARGE INSTRUCTIONS
Follow-up with Dr. Tsang on Monday.  Please call first thing Monday morning for appt.    Stop drinking alcohol!    Return to the ER for any worsening depression, thoughts of harming herself or others, auditory or visual hallucinations, worsening abdominal discomfort/chest pain, nausea, vomiting, fever, chills, worsening cough, coughing of rust colored phlegm or blood, or for any concerns.    Please seek help at [facility] for both your depression and your alcohol abuse.

## 2019-11-15 NOTE — ED NOTES
Pt resting comfortably, maintaining patent airway, pt given glass of water, tolerated well, pt unable to ambulate at this time, due to unstable on feet

## 2019-11-15 NOTE — ED NOTES
Received report from DARNELL Lau, taking over pt care, pt resting comfortably, bed in lowered position side rails up, call light in reach
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
## 2019-11-15 NOTE — ED NOTES
Pt resting comfortably, no needs at this time, pt maintaining patent airway, bed in lowered position side rails up, call light in reach

## 2019-11-15 NOTE — ED NOTES
Pt resting comfortably, maintaining patent airway, no needs at this time, but unable to ambulate at this time,bed in lowered position, side rails up call light in reach

## 2019-11-16 NOTE — ED NOTES
Education provided to on discharge instructions, follow up instructions. Pt verbalized understanding. Pt dressed and walked out in stable condition.

## 2019-11-16 NOTE — ED NOTES
Rounded on Pt. Pt seen by Alert Team. Alert RN to followup with ERP about DC instructions. Pt resting in bed with stable VS.    Discussed POC with Pt. Pt verbalizes understanding. Pt denies any needs at this time. Pt's call light is within reach and bed is in low locked position.

## 2019-11-16 NOTE — ED NOTES
Pt resting comfortably, no needs at this time, bed in lowered position side rails up, call light in reach
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
## 2020-05-09 NOTE — NUR
PT STS HE LIVES ABOUT [place] FROM HERE SO HE WILL STAY AT [facility] BEFORE 
GOING HOME TOMORROW

## 2020-05-09 NOTE — NUR
ATTEMPTED TO AMBULATE PT, PT REQUIRES SIGNIFICANT AMOUNT OF ASSISTANCE, STILL 
TOO UNSTEADY FOR DC AT THIS TIME

## 2020-05-10 NOTE — NUR
ATTEMPTED TO AMBULATE PT, PT SHAKEY AND STILL RATHER UNSTEADY GRABBING GURNEY, 
WALL AND STS HE IS DIZZY

## 2020-05-10 NOTE — NUR
PT AWAKE AND REQUESTING SOMETHING TO EAT.  PT GIVEN SNACKS AND DRINKS.  PT 
DENIES FURTHER NEEDS AT THIS TIME.

## 2020-05-31 ENCOUNTER — HOSPITAL ENCOUNTER (INPATIENT)
Dept: HOSPITAL 8 - ED | Age: 60
LOS: 3 days | Discharge: HOME | DRG: 682 | End: 2020-06-03
Attending: STUDENT IN AN ORGANIZED HEALTH CARE EDUCATION/TRAINING PROGRAM | Admitting: INTERNAL MEDICINE
Payer: COMMERCIAL

## 2020-05-31 VITALS — DIASTOLIC BLOOD PRESSURE: 50 MMHG | SYSTOLIC BLOOD PRESSURE: 94 MMHG

## 2020-05-31 VITALS — DIASTOLIC BLOOD PRESSURE: 44 MMHG | SYSTOLIC BLOOD PRESSURE: 80 MMHG

## 2020-05-31 VITALS — DIASTOLIC BLOOD PRESSURE: 50 MMHG | SYSTOLIC BLOOD PRESSURE: 89 MMHG

## 2020-05-31 VITALS — DIASTOLIC BLOOD PRESSURE: 47 MMHG | SYSTOLIC BLOOD PRESSURE: 86 MMHG

## 2020-05-31 VITALS — SYSTOLIC BLOOD PRESSURE: 84 MMHG | DIASTOLIC BLOOD PRESSURE: 46 MMHG

## 2020-05-31 VITALS — HEIGHT: 68 IN | WEIGHT: 145.06 LBS | BODY MASS INDEX: 21.99 KG/M2

## 2020-05-31 VITALS — SYSTOLIC BLOOD PRESSURE: 94 MMHG | DIASTOLIC BLOOD PRESSURE: 55 MMHG

## 2020-05-31 VITALS — SYSTOLIC BLOOD PRESSURE: 98 MMHG | DIASTOLIC BLOOD PRESSURE: 48 MMHG

## 2020-05-31 DIAGNOSIS — D69.6: ICD-10-CM

## 2020-05-31 DIAGNOSIS — Z66: ICD-10-CM

## 2020-05-31 DIAGNOSIS — E11.22: ICD-10-CM

## 2020-05-31 DIAGNOSIS — E78.00: ICD-10-CM

## 2020-05-31 DIAGNOSIS — E11.65: ICD-10-CM

## 2020-05-31 DIAGNOSIS — N39.0: ICD-10-CM

## 2020-05-31 DIAGNOSIS — E43: ICD-10-CM

## 2020-05-31 DIAGNOSIS — E87.8: ICD-10-CM

## 2020-05-31 DIAGNOSIS — E87.1: ICD-10-CM

## 2020-05-31 DIAGNOSIS — Z87.891: ICD-10-CM

## 2020-05-31 DIAGNOSIS — I12.9: ICD-10-CM

## 2020-05-31 DIAGNOSIS — B95.2: ICD-10-CM

## 2020-05-31 DIAGNOSIS — M62.82: ICD-10-CM

## 2020-05-31 DIAGNOSIS — N17.9: Primary | ICD-10-CM

## 2020-05-31 DIAGNOSIS — E83.51: ICD-10-CM

## 2020-05-31 DIAGNOSIS — E86.0: ICD-10-CM

## 2020-05-31 DIAGNOSIS — E87.6: ICD-10-CM

## 2020-05-31 DIAGNOSIS — N18.9: ICD-10-CM

## 2020-05-31 DIAGNOSIS — E78.5: ICD-10-CM

## 2020-05-31 LAB
ALBUMIN SERPL-MCNC: 2.4 G/DL (ref 3.4–5)
ALBUMIN SERPL-MCNC: 2.9 G/DL (ref 3.4–5)
ALP SERPL-CCNC: 212 U/L (ref 45–117)
ALT SERPL-CCNC: 114 U/L (ref 12–78)
ANION GAP SERPL CALC-SCNC: 10 MMOL/L (ref 5–15)
ANION GAP SERPL CALC-SCNC: 11 MMOL/L (ref 5–15)
ANION GAP SERPL CALC-SCNC: 17 MMOL/L (ref 5–15)
BASOPHILS # BLD AUTO: 0.02 X10^3/UL (ref 0–0.1)
BASOPHILS # BLD AUTO: 0.03 X10^3/UL (ref 0–0.1)
BASOPHILS NFR BLD AUTO: 0 % (ref 0–1)
BASOPHILS NFR BLD AUTO: 0 % (ref 0–1)
BILIRUB SERPL-MCNC: 1.6 MG/DL (ref 0.2–1)
CALCIUM SERPL-MCNC: 7.1 MG/DL (ref 8.5–10.1)
CALCIUM SERPL-MCNC: 7.6 MG/DL (ref 8.5–10.1)
CALCIUM SERPL-MCNC: 9.1 MG/DL (ref 8.5–10.1)
CHLORIDE SERPL-SCNC: 73 MMOL/L (ref 98–107)
CHLORIDE SERPL-SCNC: 81 MMOL/L (ref 98–107)
CHLORIDE SERPL-SCNC: 91 MMOL/L (ref 98–107)
CLOSTRIDIUM DIFFICILE ANTIGEN: NEGATIVE
CLOSTRIDIUM DIFFICILE TOXIN: NEGATIVE
CREAT SERPL-MCNC: 1.84 MG/DL (ref 0.7–1.3)
CREAT SERPL-MCNC: 2.35 MG/DL (ref 0.7–1.3)
CREAT SERPL-MCNC: 2.68 MG/DL (ref 0.7–1.3)
EOSINOPHIL # BLD AUTO: 0.01 X10^3/UL (ref 0–0.4)
EOSINOPHIL # BLD AUTO: 0.04 X10^3/UL (ref 0–0.4)
EOSINOPHIL NFR BLD AUTO: 0 % (ref 1–7)
EOSINOPHIL NFR BLD AUTO: 0 % (ref 1–7)
ERYTHROCYTE [DISTWIDTH] IN BLOOD BY AUTOMATED COUNT: 17 % (ref 9.4–14.8)
ERYTHROCYTE [DISTWIDTH] IN BLOOD BY AUTOMATED COUNT: 17.4 % (ref 9.4–14.8)
INR PPP: 1.05 (ref 0.93–1.1)
LYMPHOCYTES # BLD AUTO: 1.61 X10^3/UL (ref 1–3.4)
LYMPHOCYTES # BLD AUTO: 1.65 X10^3/UL (ref 1–3.4)
LYMPHOCYTES NFR BLD AUTO: 12 % (ref 22–44)
LYMPHOCYTES NFR BLD AUTO: 17 % (ref 22–44)
MCH RBC QN AUTO: 31.1 PG (ref 27.5–34.5)
MCH RBC QN AUTO: 31.3 PG (ref 27.5–34.5)
MCHC RBC AUTO-ENTMCNC: 33.2 G/DL (ref 33.2–36.2)
MCHC RBC AUTO-ENTMCNC: 33.6 G/DL (ref 33.2–36.2)
MCV RBC AUTO: 93.1 FL (ref 81–97)
MCV RBC AUTO: 93.7 FL (ref 81–97)
MD: (no result)
MD: NO
MICROSCOPIC: (no result)
MONOCYTES # BLD AUTO: 0.49 X10^3/UL (ref 0.2–0.8)
MONOCYTES # BLD AUTO: 0.68 X10^3/UL (ref 0.2–0.8)
MONOCYTES NFR BLD AUTO: 4 % (ref 2–9)
MONOCYTES NFR BLD AUTO: 7 % (ref 2–9)
NEUTROPHILS # BLD AUTO: 11.54 X10^3/UL (ref 1.8–6.8)
NEUTROPHILS # BLD AUTO: 7.3 X10^3/UL (ref 1.8–6.8)
NEUTROPHILS NFR BLD AUTO: 76 % (ref 42–75)
NEUTROPHILS NFR BLD AUTO: 84 % (ref 42–75)
PLATELET # BLD AUTO: 105 X10^3/UL (ref 130–400)
PLATELET # BLD AUTO: 77 X10^3/UL (ref 130–400)
PMV BLD AUTO: 8.4 FL (ref 7.4–10.4)
PMV BLD AUTO: 8.5 FL (ref 7.4–10.4)
PROT SERPL-MCNC: 6.2 G/DL (ref 6.4–8.2)
PROTHROMBIN TIME: 11.1 SECONDS (ref 9.6–11.5)
RBC # BLD AUTO: 4.39 X10^6/UL (ref 4.38–5.82)
RBC # BLD AUTO: 4.89 X10^6/UL (ref 4.38–5.82)
TROPONIN I SERPL-MCNC: 0.03 NG/ML (ref 0–0.04)

## 2020-05-31 PROCEDURE — 87186 SC STD MICRODIL/AGAR DIL: CPT

## 2020-05-31 PROCEDURE — 86708 HEPATITIS A ANTIBODY: CPT

## 2020-05-31 PROCEDURE — 82550 ASSAY OF CK (CPK): CPT

## 2020-05-31 PROCEDURE — 71045 X-RAY EXAM CHEST 1 VIEW: CPT

## 2020-05-31 PROCEDURE — 80307 DRUG TEST PRSMV CHEM ANLYZR: CPT

## 2020-05-31 PROCEDURE — 83930 ASSAY OF BLOOD OSMOLALITY: CPT

## 2020-05-31 PROCEDURE — 82962 GLUCOSE BLOOD TEST: CPT

## 2020-05-31 PROCEDURE — 84145 PROCALCITONIN (PCT): CPT

## 2020-05-31 PROCEDURE — 87040 BLOOD CULTURE FOR BACTERIA: CPT

## 2020-05-31 PROCEDURE — 86704 HEP B CORE ANTIBODY TOTAL: CPT

## 2020-05-31 PROCEDURE — 99285 EMERGENCY DEPT VISIT HI MDM: CPT

## 2020-05-31 PROCEDURE — 86706 HEP B SURFACE ANTIBODY: CPT

## 2020-05-31 PROCEDURE — 76700 US EXAM ABDOM COMPLETE: CPT

## 2020-05-31 PROCEDURE — 86803 HEPATITIS C AB TEST: CPT

## 2020-05-31 PROCEDURE — 80053 COMPREHEN METABOLIC PANEL: CPT

## 2020-05-31 PROCEDURE — 82330 ASSAY OF CALCIUM: CPT

## 2020-05-31 PROCEDURE — 36415 COLL VENOUS BLD VENIPUNCTURE: CPT

## 2020-05-31 PROCEDURE — 87340 HEPATITIS B SURFACE AG IA: CPT

## 2020-05-31 PROCEDURE — 87077 CULTURE AEROBIC IDENTIFY: CPT

## 2020-05-31 PROCEDURE — 93005 ELECTROCARDIOGRAM TRACING: CPT

## 2020-05-31 PROCEDURE — 83036 HEMOGLOBIN GLYCOSYLATED A1C: CPT

## 2020-05-31 PROCEDURE — 85610 PROTHROMBIN TIME: CPT

## 2020-05-31 PROCEDURE — 96361 HYDRATE IV INFUSION ADD-ON: CPT

## 2020-05-31 PROCEDURE — 84484 ASSAY OF TROPONIN QUANT: CPT

## 2020-05-31 PROCEDURE — 84443 ASSAY THYROID STIM HORMONE: CPT

## 2020-05-31 PROCEDURE — 87324 CLOSTRIDIUM AG IA: CPT

## 2020-05-31 PROCEDURE — 87086 URINE CULTURE/COLONY COUNT: CPT

## 2020-05-31 PROCEDURE — 83690 ASSAY OF LIPASE: CPT

## 2020-05-31 PROCEDURE — 71250 CT THORAX DX C-: CPT

## 2020-05-31 PROCEDURE — 81001 URINALYSIS AUTO W/SCOPE: CPT

## 2020-05-31 PROCEDURE — 84100 ASSAY OF PHOSPHORUS: CPT

## 2020-05-31 PROCEDURE — 96374 THER/PROPH/DIAG INJ IV PUSH: CPT

## 2020-05-31 PROCEDURE — 85025 COMPLETE CBC W/AUTO DIFF WBC: CPT

## 2020-05-31 PROCEDURE — 83735 ASSAY OF MAGNESIUM: CPT

## 2020-05-31 PROCEDURE — 89055 LEUKOCYTE ASSESSMENT FECAL: CPT

## 2020-05-31 PROCEDURE — 80048 BASIC METABOLIC PNL TOTAL CA: CPT

## 2020-05-31 PROCEDURE — 82040 ASSAY OF SERUM ALBUMIN: CPT

## 2020-05-31 RX ADMIN — INSULIN LISPRO SCH UNITS: 100 INJECTION, SOLUTION INTRAVENOUS; SUBCUTANEOUS at 20:05

## 2020-05-31 RX ADMIN — HEPARIN SODIUM SCH UNITS: 5000 INJECTION, SOLUTION INTRAVENOUS; SUBCUTANEOUS at 23:34

## 2020-05-31 RX ADMIN — TAZOBACTAM SODIUM AND PIPERACILLIN SODIUM SCH MLS/HR: 375; 3 INJECTION, SOLUTION INTRAVENOUS at 23:34

## 2020-05-31 RX ADMIN — TAZOBACTAM SODIUM AND PIPERACILLIN SODIUM SCH MLS/HR: 375; 3 INJECTION, SOLUTION INTRAVENOUS at 18:18

## 2020-05-31 RX ADMIN — HEPARIN SODIUM SCH UNITS: 5000 INJECTION, SOLUTION INTRAVENOUS; SUBCUTANEOUS at 16:28

## 2020-05-31 RX ADMIN — LINEZOLID SCH MLS/HR: 600 INJECTION, SOLUTION INTRAVENOUS at 20:06

## 2020-05-31 NOTE — NUR
PT RESTING IN [facility]. MD BEDSIDE. PT EDUCATED ON PLAN OF CARE. TBADM

## 2020-05-31 NOTE — NUR
PT BIB EMS FOR NVD AND SUBJECTIVE FEVERS X 10 DAYS. PT SAYS HE HASNT BEEN ABLE 
TO EAT MUCH, IS LOSING WEIGHT AND IS DEVELOPING A COUGH. PT IS POOR HISTORIAN. 
PROVIDER IS BEDSIDE FOR ASSESSMENT

## 2020-06-01 VITALS — DIASTOLIC BLOOD PRESSURE: 55 MMHG | SYSTOLIC BLOOD PRESSURE: 99 MMHG

## 2020-06-01 VITALS — DIASTOLIC BLOOD PRESSURE: 58 MMHG | SYSTOLIC BLOOD PRESSURE: 109 MMHG

## 2020-06-01 VITALS — DIASTOLIC BLOOD PRESSURE: 64 MMHG | SYSTOLIC BLOOD PRESSURE: 113 MMHG

## 2020-06-01 LAB
ALBUMIN SERPL-MCNC: 2.2 G/DL (ref 3.4–5)
ALP SERPL-CCNC: 158 U/L (ref 45–117)
ALT SERPL-CCNC: 87 U/L (ref 12–78)
ANION GAP SERPL CALC-SCNC: 10 MMOL/L (ref 5–15)
ANION GAP SERPL CALC-SCNC: 13 MMOL/L (ref 5–15)
BILIRUB SERPL-MCNC: 1.2 MG/DL (ref 0.2–1)
CALCIUM SERPL-MCNC: 6.4 MG/DL (ref 8.5–10.1)
CALCIUM SERPL-MCNC: 6.8 MG/DL (ref 8.5–10.1)
CHLORIDE SERPL-SCNC: 97 MMOL/L (ref 98–107)
CHLORIDE SERPL-SCNC: 99 MMOL/L (ref 98–107)
CK SERPL-CCNC: 948 U/L (ref 39–308)
CREAT SERPL-MCNC: 1.05 MG/DL (ref 0.7–1.3)
CREAT SERPL-MCNC: 1.29 MG/DL (ref 0.7–1.3)
PROT SERPL-MCNC: 4.9 G/DL (ref 6.4–8.2)

## 2020-06-01 RX ADMIN — CALCIUM CARBONATE (ANTACID) CHEW TAB 500 MG SCH MG: 500 CHEW TAB at 21:26

## 2020-06-01 RX ADMIN — INSULIN LISPRO SCH UNITS: 100 INJECTION, SOLUTION INTRAVENOUS; SUBCUTANEOUS at 07:00

## 2020-06-01 RX ADMIN — HEPARIN SODIUM SCH UNITS: 5000 INJECTION, SOLUTION INTRAVENOUS; SUBCUTANEOUS at 16:22

## 2020-06-01 RX ADMIN — HEPARIN SODIUM SCH UNITS: 5000 INJECTION, SOLUTION INTRAVENOUS; SUBCUTANEOUS at 08:46

## 2020-06-01 RX ADMIN — LINEZOLID SCH MLS/HR: 600 INJECTION, SOLUTION INTRAVENOUS at 08:46

## 2020-06-01 RX ADMIN — LINEZOLID SCH MLS/HR: 600 INJECTION, SOLUTION INTRAVENOUS at 21:27

## 2020-06-01 RX ADMIN — ASPIRIN SCH MG: 81 TABLET, COATED ORAL at 08:46

## 2020-06-01 RX ADMIN — INSULIN LISPRO SCH UNITS: 100 INJECTION, SOLUTION INTRAVENOUS; SUBCUTANEOUS at 16:00

## 2020-06-01 RX ADMIN — TAZOBACTAM SODIUM AND PIPERACILLIN SODIUM SCH MLS/HR: 375; 3 INJECTION, SOLUTION INTRAVENOUS at 17:22

## 2020-06-01 RX ADMIN — INSULIN LISPRO SCH UNITS: 100 INJECTION, SOLUTION INTRAVENOUS; SUBCUTANEOUS at 11:00

## 2020-06-01 RX ADMIN — TAZOBACTAM SODIUM AND PIPERACILLIN SODIUM SCH MLS/HR: 375; 3 INJECTION, SOLUTION INTRAVENOUS at 04:29

## 2020-06-01 RX ADMIN — POTASSIUM CHLORIDE SCH MEQ: 20 TABLET, EXTENDED RELEASE ORAL at 16:22

## 2020-06-01 RX ADMIN — LOPERAMIDE HYDROCHLORIDE PRN MG: 2 CAPSULE ORAL at 11:27

## 2020-06-01 RX ADMIN — INSULIN LISPRO SCH UNITS: 100 INJECTION, SOLUTION INTRAVENOUS; SUBCUTANEOUS at 22:47

## 2020-06-01 RX ADMIN — CALCIUM CARBONATE (ANTACID) CHEW TAB 500 MG SCH MG: 500 CHEW TAB at 08:49

## 2020-06-01 RX ADMIN — LOPERAMIDE HYDROCHLORIDE PRN MG: 2 CAPSULE ORAL at 18:39

## 2020-06-01 RX ADMIN — TAZOBACTAM SODIUM AND PIPERACILLIN SODIUM SCH MLS/HR: 375; 3 INJECTION, SOLUTION INTRAVENOUS at 11:27

## 2020-06-02 VITALS — DIASTOLIC BLOOD PRESSURE: 83 MMHG | SYSTOLIC BLOOD PRESSURE: 129 MMHG

## 2020-06-02 VITALS — SYSTOLIC BLOOD PRESSURE: 125 MMHG | DIASTOLIC BLOOD PRESSURE: 73 MMHG

## 2020-06-02 VITALS — DIASTOLIC BLOOD PRESSURE: 80 MMHG | SYSTOLIC BLOOD PRESSURE: 139 MMHG

## 2020-06-02 VITALS — SYSTOLIC BLOOD PRESSURE: 120 MMHG | DIASTOLIC BLOOD PRESSURE: 74 MMHG

## 2020-06-02 LAB
ALBUMIN SERPL-MCNC: 2.5 G/DL (ref 3.4–5)
ALP SERPL-CCNC: 182 U/L (ref 45–117)
ALT SERPL-CCNC: 79 U/L (ref 12–78)
ANION GAP SERPL CALC-SCNC: 9 MMOL/L (ref 5–15)
BASOPHILS # BLD AUTO: 0.02 X10^3/UL (ref 0–0.1)
BASOPHILS NFR BLD AUTO: 0 % (ref 0–1)
BILIRUB SERPL-MCNC: 0.7 MG/DL (ref 0.2–1)
CALCIUM SERPL-MCNC: 7.3 MG/DL (ref 8.5–10.1)
CHLORIDE SERPL-SCNC: 97 MMOL/L (ref 98–107)
CREAT SERPL-MCNC: 0.88 MG/DL (ref 0.7–1.3)
EOSINOPHIL # BLD AUTO: 0.05 X10^3/UL (ref 0–0.4)
EOSINOPHIL NFR BLD AUTO: 1 % (ref 1–7)
ERYTHROCYTE [DISTWIDTH] IN BLOOD BY AUTOMATED COUNT: 17.1 % (ref 9.4–14.8)
LYMPHOCYTES # BLD AUTO: 1.03 X10^3/UL (ref 1–3.4)
LYMPHOCYTES NFR BLD AUTO: 17 % (ref 22–44)
MCH RBC QN AUTO: 31.3 PG (ref 27.5–34.5)
MCHC RBC AUTO-ENTMCNC: 33.7 G/DL (ref 33.2–36.2)
MCV RBC AUTO: 93 FL (ref 81–97)
MD: (no result)
MONOCYTES # BLD AUTO: 0.51 X10^3/UL (ref 0.2–0.8)
MONOCYTES NFR BLD AUTO: 9 % (ref 2–9)
NEUTROPHILS # BLD AUTO: 4.37 X10^3/UL (ref 1.8–6.8)
NEUTROPHILS NFR BLD AUTO: 73 % (ref 42–75)
PLATELET # BLD AUTO: 80 X10^3/UL (ref 130–400)
PMV BLD AUTO: 7.7 FL (ref 7.4–10.4)
PROT SERPL-MCNC: 5.3 G/DL (ref 6.4–8.2)
RBC # BLD AUTO: 3.9 X10^6/UL (ref 4.38–5.82)

## 2020-06-02 RX ADMIN — CALCIUM CARBONATE (ANTACID) CHEW TAB 500 MG SCH MG: 500 CHEW TAB at 21:35

## 2020-06-02 RX ADMIN — POTASSIUM CHLORIDE SCH MEQ: 20 TABLET, EXTENDED RELEASE ORAL at 09:17

## 2020-06-02 RX ADMIN — CALCIUM CARBONATE (ANTACID) CHEW TAB 500 MG SCH MG: 500 CHEW TAB at 09:18

## 2020-06-02 RX ADMIN — DOXYCYCLINE HYCLATE SCH MG: 100 TABLET, FILM COATED ORAL at 21:35

## 2020-06-02 RX ADMIN — TAZOBACTAM SODIUM AND PIPERACILLIN SODIUM SCH MLS/HR: 375; 3 INJECTION, SOLUTION INTRAVENOUS at 00:40

## 2020-06-02 RX ADMIN — ASPIRIN SCH MG: 81 TABLET, COATED ORAL at 09:18

## 2020-06-02 RX ADMIN — INSULIN LISPRO SCH UNITS: 100 INJECTION, SOLUTION INTRAVENOUS; SUBCUTANEOUS at 11:09

## 2020-06-02 RX ADMIN — INSULIN LISPRO SCH UNITS: 100 INJECTION, SOLUTION INTRAVENOUS; SUBCUTANEOUS at 21:35

## 2020-06-02 RX ADMIN — INSULIN LISPRO SCH UNITS: 100 INJECTION, SOLUTION INTRAVENOUS; SUBCUTANEOUS at 11:00

## 2020-06-02 RX ADMIN — TAZOBACTAM SODIUM AND PIPERACILLIN SODIUM SCH MLS/HR: 375; 3 INJECTION, SOLUTION INTRAVENOUS at 06:12

## 2020-06-02 RX ADMIN — INSULIN LISPRO SCH UNITS: 100 INJECTION, SOLUTION INTRAVENOUS; SUBCUTANEOUS at 16:46

## 2020-06-02 RX ADMIN — HEPARIN SODIUM SCH UNITS: 5000 INJECTION, SOLUTION INTRAVENOUS; SUBCUTANEOUS at 16:40

## 2020-06-02 RX ADMIN — POTASSIUM CHLORIDE SCH MEQ: 20 TABLET, EXTENDED RELEASE ORAL at 16:39

## 2020-06-02 RX ADMIN — HEPARIN SODIUM SCH UNITS: 5000 INJECTION, SOLUTION INTRAVENOUS; SUBCUTANEOUS at 09:18

## 2020-06-02 RX ADMIN — AMOXICILLIN AND CLAVULANATE POTASSIUM SCH TAB: 875; 125 TABLET, FILM COATED ORAL at 18:19

## 2020-06-02 RX ADMIN — HEPARIN SODIUM SCH UNITS: 5000 INJECTION, SOLUTION INTRAVENOUS; SUBCUTANEOUS at 00:40

## 2020-06-03 VITALS — SYSTOLIC BLOOD PRESSURE: 147 MMHG | DIASTOLIC BLOOD PRESSURE: 81 MMHG

## 2020-06-03 VITALS — DIASTOLIC BLOOD PRESSURE: 95 MMHG | SYSTOLIC BLOOD PRESSURE: 142 MMHG

## 2020-06-03 VITALS — SYSTOLIC BLOOD PRESSURE: 126 MMHG | DIASTOLIC BLOOD PRESSURE: 85 MMHG

## 2020-06-03 LAB
ALBUMIN SERPL-MCNC: 2.5 G/DL (ref 3.4–5)
ALP SERPL-CCNC: 185 U/L (ref 45–117)
ALT SERPL-CCNC: 87 U/L (ref 12–78)
ANION GAP SERPL CALC-SCNC: 6 MMOL/L (ref 5–15)
BASOPHILS # BLD AUTO: 0.03 X10^3/UL (ref 0–0.1)
BASOPHILS NFR BLD AUTO: 0 % (ref 0–1)
BILIRUB SERPL-MCNC: 0.6 MG/DL (ref 0.2–1)
CALCIUM SERPL-MCNC: 7.7 MG/DL (ref 8.5–10.1)
CHLORIDE SERPL-SCNC: 103 MMOL/L (ref 98–107)
CK SERPL-CCNC: 430 U/L (ref 39–308)
CREAT SERPL-MCNC: 0.75 MG/DL (ref 0.7–1.3)
EOSINOPHIL # BLD AUTO: 0.07 X10^3/UL (ref 0–0.4)
EOSINOPHIL NFR BLD AUTO: 1 % (ref 1–7)
ERYTHROCYTE [DISTWIDTH] IN BLOOD BY AUTOMATED COUNT: 17.2 % (ref 9.4–14.8)
EST. AVERAGE GLUCOSE BLD GHB EST-MCNC: 160 MG/DL (ref 0–126)
LYMPHOCYTES # BLD AUTO: 1.29 X10^3/UL (ref 1–3.4)
LYMPHOCYTES NFR BLD AUTO: 22 % (ref 22–44)
MCH RBC QN AUTO: 30.8 PG (ref 27.5–34.5)
MCHC RBC AUTO-ENTMCNC: 33.1 G/DL (ref 33.2–36.2)
MCV RBC AUTO: 93 FL (ref 81–97)
MD: NO
MONOCYTES # BLD AUTO: 0.89 X10^3/UL (ref 0.2–0.8)
MONOCYTES NFR BLD AUTO: 15 % (ref 2–9)
NEUTROPHILS # BLD AUTO: 3.55 X10^3/UL (ref 1.8–6.8)
NEUTROPHILS NFR BLD AUTO: 61 % (ref 42–75)
PLATELET # BLD AUTO: 113 X10^3/UL (ref 130–400)
PMV BLD AUTO: 7.7 FL (ref 7.4–10.4)
PROT SERPL-MCNC: 5.7 G/DL (ref 6.4–8.2)
RBC # BLD AUTO: 3.98 X10^6/UL (ref 4.38–5.82)

## 2020-06-03 RX ADMIN — DOXYCYCLINE HYCLATE SCH MG: 100 TABLET, FILM COATED ORAL at 09:14

## 2020-06-03 RX ADMIN — POTASSIUM CHLORIDE SCH MEQ: 20 TABLET, EXTENDED RELEASE ORAL at 09:14

## 2020-06-03 RX ADMIN — HEPARIN SODIUM SCH UNITS: 5000 INJECTION, SOLUTION INTRAVENOUS; SUBCUTANEOUS at 15:07

## 2020-06-03 RX ADMIN — HEPARIN SODIUM SCH UNITS: 5000 INJECTION, SOLUTION INTRAVENOUS; SUBCUTANEOUS at 00:11

## 2020-06-03 RX ADMIN — INSULIN LISPRO SCH UNITS: 100 INJECTION, SOLUTION INTRAVENOUS; SUBCUTANEOUS at 15:16

## 2020-06-03 RX ADMIN — INSULIN LISPRO SCH UNITS: 100 INJECTION, SOLUTION INTRAVENOUS; SUBCUTANEOUS at 12:28

## 2020-06-03 RX ADMIN — ASPIRIN SCH MG: 81 TABLET, COATED ORAL at 09:14

## 2020-06-03 RX ADMIN — HEPARIN SODIUM SCH UNITS: 5000 INJECTION, SOLUTION INTRAVENOUS; SUBCUTANEOUS at 09:15

## 2020-06-03 RX ADMIN — INSULIN LISPRO SCH UNITS: 100 INJECTION, SOLUTION INTRAVENOUS; SUBCUTANEOUS at 08:18

## 2020-06-03 RX ADMIN — CALCIUM CARBONATE (ANTACID) CHEW TAB 500 MG SCH MG: 500 CHEW TAB at 09:14

## 2020-06-03 RX ADMIN — AMOXICILLIN AND CLAVULANATE POTASSIUM SCH TAB: 875; 125 TABLET, FILM COATED ORAL at 09:14

## 2020-06-24 ENCOUNTER — HOSPITAL ENCOUNTER (EMERGENCY)
Dept: HOSPITAL 8 - ED | Age: 60
Discharge: HOME | End: 2020-06-24
Payer: COMMERCIAL

## 2020-06-24 VITALS — BODY MASS INDEX: 24.53 KG/M2 | WEIGHT: 156.31 LBS | HEIGHT: 67 IN

## 2020-06-24 VITALS — SYSTOLIC BLOOD PRESSURE: 141 MMHG | DIASTOLIC BLOOD PRESSURE: 78 MMHG

## 2020-06-24 DIAGNOSIS — M62.838: ICD-10-CM

## 2020-06-24 DIAGNOSIS — E78.00: ICD-10-CM

## 2020-06-24 DIAGNOSIS — S16.1XXA: Primary | ICD-10-CM
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
## 2020-06-24 DIAGNOSIS — I10: ICD-10-CM
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
## 2020-06-24 DIAGNOSIS — E11.9: ICD-10-CM

## 2020-06-24 PROCEDURE — 72141 MRI NECK SPINE W/O DYE: CPT

## 2020-06-24 PROCEDURE — 99284 EMERGENCY DEPT VISIT MOD MDM: CPT
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
## 2020-06-24 NOTE — NUR
THIS FLOAT RN AT BEDSIDE TO DC PT FOR PRIMARY RN, KATHLEEN. PT VERBALIZED 
UNDERSTANDING TO DC INSTRUCTIONS. AMBULATORY TO CHECKOUT C STEADY GAIT. VSS.

## 2021-09-10 ENCOUNTER — APPOINTMENT (OUTPATIENT)
Dept: RADIOLOGY | Facility: MEDICAL CENTER | Age: 61
End: 2021-09-10
Attending: EMERGENCY MEDICINE
Payer: COMMERCIAL

## 2021-09-10 ENCOUNTER — HOSPITAL ENCOUNTER (EMERGENCY)
Facility: MEDICAL CENTER | Age: 61
End: 2021-09-10
Attending: EMERGENCY MEDICINE
Payer: COMMERCIAL

## 2021-09-10 VITALS
HEIGHT: 67 IN | TEMPERATURE: 98 F | RESPIRATION RATE: 18 BRPM | OXYGEN SATURATION: 91 % | SYSTOLIC BLOOD PRESSURE: 144 MMHG | WEIGHT: 168 LBS | HEART RATE: 92 BPM | BODY MASS INDEX: 26.37 KG/M2 | DIASTOLIC BLOOD PRESSURE: 82 MMHG

## 2021-09-10 DIAGNOSIS — S09.90XA CLOSED HEAD INJURY, INITIAL ENCOUNTER: ICD-10-CM

## 2021-09-10 DIAGNOSIS — F10.929 ALCOHOLIC INTOXICATION WITH COMPLICATION (HCC): ICD-10-CM

## 2021-09-10 LAB
ALBUMIN SERPL BCP-MCNC: 3.8 G/DL (ref 3.2–4.9)
ALBUMIN/GLOB SERPL: 1.8 G/DL
ALP SERPL-CCNC: 108 U/L (ref 30–99)
ALT SERPL-CCNC: 52 U/L (ref 2–50)
ANION GAP SERPL CALC-SCNC: 15 MMOL/L (ref 7–16)
AST SERPL-CCNC: 46 U/L (ref 12–45)
BASOPHILS # BLD AUTO: 0.3 % (ref 0–1.8)
BASOPHILS # BLD: 0.03 K/UL (ref 0–0.12)
BILIRUB SERPL-MCNC: 0.5 MG/DL (ref 0.1–1.5)
BUN SERPL-MCNC: 20 MG/DL (ref 8–22)
CALCIUM SERPL-MCNC: 7.7 MG/DL (ref 8.5–10.5)
CHLORIDE SERPL-SCNC: 98 MMOL/L (ref 96–112)
CO2 SERPL-SCNC: 24 MMOL/L (ref 20–33)
CREAT SERPL-MCNC: 0.65 MG/DL (ref 0.5–1.4)
EOSINOPHIL # BLD AUTO: 0.04 K/UL (ref 0–0.51)
EOSINOPHIL NFR BLD: 0.4 % (ref 0–6.9)
ERYTHROCYTE [DISTWIDTH] IN BLOOD BY AUTOMATED COUNT: 40.6 FL (ref 35.9–50)
ETHANOL BLD-MCNC: 242 MG/DL (ref 0–10)
GLOBULIN SER CALC-MCNC: 2.1 G/DL (ref 1.9–3.5)
GLUCOSE SERPL-MCNC: 272 MG/DL (ref 65–99)
HCT VFR BLD AUTO: 54.1 % (ref 42–52)
HGB BLD-MCNC: 18.8 G/DL (ref 14–18)
IMM GRANULOCYTES # BLD AUTO: 0.05 K/UL (ref 0–0.11)
IMM GRANULOCYTES NFR BLD AUTO: 0.5 % (ref 0–0.9)
LYMPHOCYTES # BLD AUTO: 2.36 K/UL (ref 1–4.8)
LYMPHOCYTES NFR BLD: 22.6 % (ref 22–41)
MCH RBC QN AUTO: 30 PG (ref 27–33)
MCHC RBC AUTO-ENTMCNC: 34.8 G/DL (ref 33.7–35.3)
MCV RBC AUTO: 86.4 FL (ref 81.4–97.8)
MONOCYTES # BLD AUTO: 0.64 K/UL (ref 0–0.85)
MONOCYTES NFR BLD AUTO: 6.1 % (ref 0–13.4)
NEUTROPHILS # BLD AUTO: 7.34 K/UL (ref 1.82–7.42)
NEUTROPHILS NFR BLD: 70.1 % (ref 44–72)
NRBC # BLD AUTO: 0 K/UL
NRBC BLD-RTO: 0 /100 WBC
PLATELET # BLD AUTO: 194 K/UL (ref 164–446)
PMV BLD AUTO: 9.2 FL (ref 9–12.9)
POTASSIUM SERPL-SCNC: 4.2 MMOL/L (ref 3.6–5.5)
PROT SERPL-MCNC: 5.9 G/DL (ref 6–8.2)
RBC # BLD AUTO: 6.26 M/UL (ref 4.7–6.1)
SODIUM SERPL-SCNC: 137 MMOL/L (ref 135–145)
WBC # BLD AUTO: 10.5 K/UL (ref 4.8–10.8)

## 2021-09-10 PROCEDURE — 36415 COLL VENOUS BLD VENIPUNCTURE: CPT

## 2021-09-10 PROCEDURE — 99285 EMERGENCY DEPT VISIT HI MDM: CPT

## 2021-09-10 PROCEDURE — 71045 X-RAY EXAM CHEST 1 VIEW: CPT

## 2021-09-10 PROCEDURE — 82077 ASSAY SPEC XCP UR&BREATH IA: CPT

## 2021-09-10 PROCEDURE — 80053 COMPREHEN METABOLIC PANEL: CPT

## 2021-09-10 PROCEDURE — 70450 CT HEAD/BRAIN W/O DYE: CPT

## 2021-09-10 PROCEDURE — 72125 CT NECK SPINE W/O DYE: CPT

## 2021-09-10 PROCEDURE — 85025 COMPLETE CBC W/AUTO DIFF WBC: CPT

## 2021-09-10 ASSESSMENT — FIBROSIS 4 INDEX: FIB4 SCORE: 7.61

## 2021-09-10 NOTE — ED PROVIDER NOTES
ED Provider Note    CHIEF COMPLAINT  Chief Complaint   Patient presents with   • GLF     via EMS, pt tripped over feet while walking, fell and hit back of head. per EMS +LOC, patient denies this. patient denies taking blood thinners. +ETOH, states drank 1 pint of vodka tonight.        HPI  Artem Weinstein is a 60 y.o. male who presents for evaluation of a ground-level fall in which he tripped and fell backwards hitting the back of his head.  Patient notes he drank vodka and denies taking blood thinners.  He notes he has mild pain to the back of his head but no neck pain, chest pain, or significant abdominal pain.  He denies any dizziness or lightheadedness and no numbness, tingling, or focal weakness to extremities.  He has no visual changes no difficulty speaking or swallowing    REVIEW OF SYSTEMS  Constitutional: No fevers or chills  Skin: No rashes, abrasions, or lacerations  HEENT: No sore throat, runny nose, sores, trouble swallowing, trouble speaking.  Neck: No neck pain, stiffness, or masses.  Chest: No pain or rashes  Pulm: No shortness of breath, cough, wheezing, stridor, or pain with inspiration/expiration  Gastrointestinal: No nausea, vomiting, diarrhea, or abdominal pain.  Genitourinary: No dysuria or hematuria  Musculoskeletal: Nopain, swelling, weakness  Neurologic: No sensory or motor changes. No confusion or disorientation.  Heme: No bleeding or bruising problems.   Immuno: No hx of recurrent infections      PAST MEDICAL HISTORY   has a past medical history of Diabetes (HCC).    SOCIAL HISTORY  Social History     Tobacco Use   • Smoking status: Current Every Day Smoker     Packs/day: 0.00     Types: Cigarettes   • Smokeless tobacco: Never Used   Substance and Sexual Activity   • Alcohol use: Yes     Comment: occasionally   • Drug use: No   • Sexual activity: Not on file       SURGICAL HISTORY  patient denies any surgical history    CURRENT MEDICATIONS  Home Medications     Reviewed by Crystal MARTINEZ  "BHARATHI Devine (Registered Nurse) on 09/10/21 at 0322  Med List Status: Partial   Medication Last Dose Status   famotidine (PEPCID) 20 MG Tab  Active   ondansetron (ZOFRAN ODT) 4 MG TABLET DISPERSIBLE  Active                ALLERGIES  No Known Allergies    PHYSICAL EXAM  VITAL SIGNS: /82   Pulse 92   Temp 36.7 °C (98 °F) (Temporal)   Resp 18   Ht 1.702 m (5' 7\")   Wt 76.2 kg (168 lb)   SpO2 91%   BMI 26.31 kg/m²    Gen: Alert in no apparent distress.  Calm, conversant  HEENT: No signs of trauma, Bilateral external ears normal, Nose normal. Conjunctiva normal, Non-icteric.  No morejon sign.  No scalp step-offs, deformities, hematomas, abrasions, or lacerations noted.  Neck:  No tenderness, Supple, No masses  Lymphatic: No cervical lymphadenopathy noted.   Cardiovascular: Regular rate in the 90s., no murmurs.  Capillary refill less than 3 seconds to all extremities, 2+ distal pulses.  Thorax & Lungs: Normal breath sounds, No respiratory distress, No wheezing bilateral chest rise  Abdomen: Bowel sounds normal, Soft, No tenderness, No masses, No pulsatile masses. No Guarding or rebound  Skin: Warm, Dry, No erythema, No rash noted to exposed areas.   Back: No bony tenderness, No CVA tenderness.   Extremities: Intact distal pulses, No edema  Neurologic: Alert , no facial droop, grossly normal coordination and strength  Psychiatric: Affect pleasant      LABS  Results for orders placed or performed during the hospital encounter of 09/10/21   CBC WITH DIFFERENTIAL   Result Value Ref Range    WBC 10.5 4.8 - 10.8 K/uL    RBC 6.26 (H) 4.70 - 6.10 M/uL    Hemoglobin 18.8 (H) 14.0 - 18.0 g/dL    Hematocrit 54.1 (H) 42.0 - 52.0 %    MCV 86.4 81.4 - 97.8 fL    MCH 30.0 27.0 - 33.0 pg    MCHC 34.8 33.7 - 35.3 g/dL    RDW 40.6 35.9 - 50.0 fL    Platelet Count 194 164 - 446 K/uL    MPV 9.2 9.0 - 12.9 fL    Neutrophils-Polys 70.10 44.00 - 72.00 %    Lymphocytes 22.60 22.00 - 41.00 %    Monocytes 6.10 0.00 - 13.40 %    " Eosinophils 0.40 0.00 - 6.90 %    Basophils 0.30 0.00 - 1.80 %    Immature Granulocytes 0.50 0.00 - 0.90 %    Nucleated RBC 0.00 /100 WBC    Neutrophils (Absolute) 7.34 1.82 - 7.42 K/uL    Lymphs (Absolute) 2.36 1.00 - 4.80 K/uL    Monos (Absolute) 0.64 0.00 - 0.85 K/uL    Eos (Absolute) 0.04 0.00 - 0.51 K/uL    Baso (Absolute) 0.03 0.00 - 0.12 K/uL    Immature Granulocytes (abs) 0.05 0.00 - 0.11 K/uL    NRBC (Absolute) 0.00 K/uL   COMP METABOLIC PANEL   Result Value Ref Range    Sodium 137 135 - 145 mmol/L    Potassium 4.2 3.6 - 5.5 mmol/L    Chloride 98 96 - 112 mmol/L    Co2 24 20 - 33 mmol/L    Anion Gap 15.0 7.0 - 16.0    Glucose 272 (H) 65 - 99 mg/dL    Bun 20 8 - 22 mg/dL    Creatinine 0.65 0.50 - 1.40 mg/dL    Calcium 7.7 (L) 8.5 - 10.5 mg/dL    AST(SGOT) 46 (H) 12 - 45 U/L    ALT(SGPT) 52 (H) 2 - 50 U/L    Alkaline Phosphatase 108 (H) 30 - 99 U/L    Total Bilirubin 0.5 0.1 - 1.5 mg/dL    Albumin 3.8 3.2 - 4.9 g/dL    Total Protein 5.9 (L) 6.0 - 8.2 g/dL    Globulin 2.1 1.9 - 3.5 g/dL    A-G Ratio 1.8 g/dL   DIAGNOSTIC ALCOHOL   Result Value Ref Range    Diagnostic Alcohol 242.0 (H) 0.0 - 10.0 mg/dL   ESTIMATED GFR   Result Value Ref Range    GFR If African American >60 >60 mL/min/1.73 m 2    GFR If Non African American >60 >60 mL/min/1.73 m 2       RADIOLOGY  DX-CHEST-PORTABLE (1 VIEW)   Final Result         1.  No acute cardiopulmonary disease.      CT-HEAD W/O   Final Result         1.  No acute intracranial abnormality is identified, there are nonspecific white matter changes, commonly associated with small vessel ischemic disease.  Associated mild cerebral atrophy is noted.   2.  Atherosclerosis.      CT-CSPINE WITHOUT PLUS RECONS   Final Result         1.  No acute traumatic bony injury of the cervical spine is apparent.   2.  Atherosclerosis          COURSE & MEDICAL DECISION MAKING  Patient arrives for what appears to be a mechanical ground-level fall most likely due to alcohol intoxication.  On my  evaluation he is calm, conversant, has no convincing findings to suggest any significant trauma.  He is intoxicated by report and I feel CT imaging of his head neck is necessary.  Laboratory evaluation will be obtained.  At this point I do not find any convincing evidence to obtain advanced imaging of his thorax or abdomen/pelvis.  4:47 AM  Patient reevaluated at bedside.  He is tired appearing but otherwise oriented.  States understanding the findings including the fact that he has some mild damage to his liver, most likely from drinking.  At this point there do not appear to be any criteria for inpatient admission or expert consultation and, given his laboratory evaluation, I do not feel CT imaging of his abdomen and pelvis is necessary as I very much doubt it will .  Patient is oxygenation is 92% with a good waveform on my evaluation and he does not have any tachypnea or tachycardia.  I feel he is safe for discharge at this point and was encouraged to avoid alcohol.  He will return if his symptoms worsen or change in any way.  FINAL IMPRESSION  1. Closed head injury, initial encounter    2. Alcoholic intoxication with complication (HCC)        Electronically signed by: Stepan Peraza M.D., 9/10/2021 3:30 AM

## 2021-09-10 NOTE — ED NOTES
PIV removed, pt ambulatory with steady gait. Pt states will call uber to get back to hotel. DC with written and verbal instructions regarding f/u, activity. Verbalized understanding, WC out to NAEEM arriola.

## 2021-09-10 NOTE — ED TRIAGE NOTES
Chief Complaint   Patient presents with   • GLF     via EMS, pt tripped over feet while walking, fell and hit back of head. per EMS +LOC, patient denies this. patient denies taking blood thinners. +ETOH, states drank 1 pint of vodka tonight.      PT BIB [facility] for above complaint. Pt placed in ambulance bay for approx 6 hours due to extended wait times. Pt A&Ox4, GCS 15. Pt now reporting some abd pain. Also reports nausea. States has hx of kidney stones. Also has hx of DM, takes metformin.   Pt placed on monitor. ERP to see.

## 2021-09-14 ENCOUNTER — HOSPITAL ENCOUNTER (OUTPATIENT)
Facility: MEDICAL CENTER | Age: 61
End: 2021-09-16
Attending: EMERGENCY MEDICINE | Admitting: INTERNAL MEDICINE
Payer: COMMERCIAL

## 2021-09-14 ENCOUNTER — APPOINTMENT (OUTPATIENT)
Dept: RADIOLOGY | Facility: MEDICAL CENTER | Age: 61
End: 2021-09-14
Attending: EMERGENCY MEDICINE
Payer: COMMERCIAL

## 2021-09-14 DIAGNOSIS — E87.1 HYPONATREMIA: ICD-10-CM

## 2021-09-14 DIAGNOSIS — R11.2 NAUSEA AND VOMITING, INTRACTABILITY OF VOMITING NOT SPECIFIED, UNSPECIFIED VOMITING TYPE: ICD-10-CM

## 2021-09-14 DIAGNOSIS — F10.939 ALCOHOL WITHDRAWAL SYNDROME WITH COMPLICATION (HCC): ICD-10-CM

## 2021-09-14 DIAGNOSIS — R07.81 RIB PAIN: ICD-10-CM

## 2021-09-14 DIAGNOSIS — R33.9 URINARY RETENTION: ICD-10-CM

## 2021-09-14 PROBLEM — Z72.0 TOBACCO ABUSE: Status: ACTIVE | Noted: 2021-09-14

## 2021-09-14 PROBLEM — E11.9 DM (DIABETES MELLITUS) (HCC): Status: ACTIVE | Noted: 2021-09-14

## 2021-09-14 PROBLEM — I10 HYPERTENSION: Status: ACTIVE | Noted: 2021-09-14

## 2021-09-14 PROBLEM — R79.89 ELEVATED LFTS: Status: ACTIVE | Noted: 2021-09-14

## 2021-09-14 PROBLEM — D69.6 THROMBOCYTOPENIA (HCC): Status: ACTIVE | Noted: 2021-09-14

## 2021-09-14 PROBLEM — K21.9 GERD (GASTROESOPHAGEAL REFLUX DISEASE): Status: ACTIVE | Noted: 2021-09-14

## 2021-09-14 PROBLEM — R10.9 RIGHT FLANK PAIN: Status: ACTIVE | Noted: 2021-09-14

## 2021-09-14 PROBLEM — F10.10 ALCOHOL ABUSE: Status: ACTIVE | Noted: 2021-09-14

## 2021-09-14 PROBLEM — E78.5 DYSLIPIDEMIA: Status: ACTIVE | Noted: 2021-09-14

## 2021-09-14 PROBLEM — D72.829 LEUKOCYTOSIS: Status: ACTIVE | Noted: 2021-09-14

## 2021-09-14 PROBLEM — E87.8 ELECTROLYTE ABNORMALITY: Status: ACTIVE | Noted: 2021-09-14

## 2021-09-14 LAB
ABO + RH BLD: NORMAL
ABO GROUP BLD: NORMAL
ALBUMIN SERPL BCP-MCNC: 3.8 G/DL (ref 3.2–4.9)
ALBUMIN/GLOB SERPL: 1.5 G/DL
ALP SERPL-CCNC: 139 U/L (ref 30–99)
ALT SERPL-CCNC: 48 U/L (ref 2–50)
ANION GAP SERPL CALC-SCNC: 23 MMOL/L (ref 7–16)
APPEARANCE UR: CLEAR
APTT PPP: 28.3 SEC (ref 24.7–36)
AST SERPL-CCNC: 50 U/L (ref 12–45)
B-OH-BUTYR SERPL-MCNC: 2.7 MMOL/L (ref 0.02–0.27)
BACTERIA #/AREA URNS HPF: NEGATIVE /HPF
BASE EXCESS BLDV CALC-SCNC: -7 MMOL/L
BASOPHILS # BLD AUTO: 0.2 % (ref 0–1.8)
BASOPHILS # BLD: 0.02 K/UL (ref 0–0.12)
BILIRUB SERPL-MCNC: 0.6 MG/DL (ref 0.1–1.5)
BILIRUB UR QL STRIP.AUTO: NEGATIVE
BLD GP AB SCN SERPL QL: NORMAL
BODY TEMPERATURE: ABNORMAL CENTIGRADE
BUN SERPL-MCNC: 17 MG/DL (ref 8–22)
CALCIUM SERPL-MCNC: 10.5 MG/DL (ref 8.5–10.5)
CHLORIDE SERPL-SCNC: 85 MMOL/L (ref 96–112)
CO2 SERPL-SCNC: 19 MMOL/L (ref 20–33)
COLOR UR: YELLOW
CREAT SERPL-MCNC: 0.75 MG/DL (ref 0.5–1.4)
EKG IMPRESSION: NORMAL
EOSINOPHIL # BLD AUTO: 0.01 K/UL (ref 0–0.51)
EOSINOPHIL NFR BLD: 0.1 % (ref 0–6.9)
EPI CELLS #/AREA URNS HPF: NEGATIVE /HPF
ERYTHROCYTE [DISTWIDTH] IN BLOOD BY AUTOMATED COUNT: 37.3 FL (ref 35.9–50)
ETHANOL BLD-MCNC: 131.2 MG/DL (ref 0–10)
GLOBULIN SER CALC-MCNC: 2.5 G/DL (ref 1.9–3.5)
GLUCOSE BLD-MCNC: 226 MG/DL (ref 65–99)
GLUCOSE BLD-MCNC: 326 MG/DL (ref 65–99)
GLUCOSE BLD-MCNC: 343 MG/DL (ref 65–99)
GLUCOSE SERPL-MCNC: 345 MG/DL (ref 65–99)
GLUCOSE UR STRIP.AUTO-MCNC: >=1000 MG/DL
HCO3 BLDV-SCNC: 16 MMOL/L (ref 24–28)
HCT VFR BLD AUTO: 47.2 % (ref 42–52)
HGB BLD-MCNC: 17.2 G/DL (ref 14–18)
HYALINE CASTS #/AREA URNS LPF: ABNORMAL /LPF
IMM GRANULOCYTES # BLD AUTO: 0.04 K/UL (ref 0–0.11)
IMM GRANULOCYTES NFR BLD AUTO: 0.4 % (ref 0–0.9)
INR PPP: 1.17 (ref 0.87–1.13)
KETONES UR STRIP.AUTO-MCNC: >=80 MG/DL
LEUKOCYTE ESTERASE UR QL STRIP.AUTO: NEGATIVE
LIPASE SERPL-CCNC: 32 U/L (ref 11–82)
LYMPHOCYTES # BLD AUTO: 1.15 K/UL (ref 1–4.8)
LYMPHOCYTES NFR BLD: 10.5 % (ref 22–41)
MCH RBC QN AUTO: 29.7 PG (ref 27–33)
MCHC RBC AUTO-ENTMCNC: 36.4 G/DL (ref 33.7–35.3)
MCV RBC AUTO: 81.5 FL (ref 81.4–97.8)
MICRO URNS: ABNORMAL
MONOCYTES # BLD AUTO: 0.61 K/UL (ref 0–0.85)
MONOCYTES NFR BLD AUTO: 5.6 % (ref 0–13.4)
NEUTROPHILS # BLD AUTO: 9.13 K/UL (ref 1.82–7.42)
NEUTROPHILS NFR BLD: 83.2 % (ref 44–72)
NITRITE UR QL STRIP.AUTO: NEGATIVE
NRBC # BLD AUTO: 0 K/UL
NRBC BLD-RTO: 0 /100 WBC
PCO2 BLDV: 27.3 MMHG (ref 41–51)
PH BLDV: 7.39 [PH] (ref 7.31–7.45)
PH UR STRIP.AUTO: 5.5 [PH] (ref 5–8)
PLATELET # BLD AUTO: 127 K/UL (ref 164–446)
PMV BLD AUTO: 10 FL (ref 9–12.9)
PO2 BLDV: 67.9 MMHG (ref 25–40)
POTASSIUM SERPL-SCNC: 4.4 MMOL/L (ref 3.6–5.5)
PROT SERPL-MCNC: 6.3 G/DL (ref 6–8.2)
PROT UR QL STRIP: 30 MG/DL
PROTHROMBIN TIME: 14.5 SEC (ref 12–14.6)
RBC # BLD AUTO: 5.79 M/UL (ref 4.7–6.1)
RBC # URNS HPF: ABNORMAL /HPF
RBC UR QL AUTO: ABNORMAL
RH BLD: NORMAL
SAO2 % BLDV: 91.5 %
SODIUM SERPL-SCNC: 127 MMOL/L (ref 135–145)
SP GR UR STRIP.AUTO: >=1.03
TROPONIN T SERPL-MCNC: 14 NG/L (ref 6–19)
UROBILINOGEN UR STRIP.AUTO-MCNC: 0.2 MG/DL
WBC # BLD AUTO: 11 K/UL (ref 4.8–10.8)
WBC #/AREA URNS HPF: ABNORMAL /HPF

## 2021-09-14 PROCEDURE — 82010 KETONE BODYS QUAN: CPT

## 2021-09-14 PROCEDURE — G0378 HOSPITAL OBSERVATION PER HR: HCPCS

## 2021-09-14 PROCEDURE — C9113 INJ PANTOPRAZOLE SODIUM, VIA: HCPCS | Performed by: EMERGENCY MEDICINE

## 2021-09-14 PROCEDURE — 96365 THER/PROPH/DIAG IV INF INIT: CPT

## 2021-09-14 PROCEDURE — 85610 PROTHROMBIN TIME: CPT

## 2021-09-14 PROCEDURE — 700102 HCHG RX REV CODE 250 W/ 637 OVERRIDE(OP): Performed by: EMERGENCY MEDICINE

## 2021-09-14 PROCEDURE — 80053 COMPREHEN METABOLIC PANEL: CPT

## 2021-09-14 PROCEDURE — 82962 GLUCOSE BLOOD TEST: CPT

## 2021-09-14 PROCEDURE — 96376 TX/PRO/DX INJ SAME DRUG ADON: CPT

## 2021-09-14 PROCEDURE — 82803 BLOOD GASES ANY COMBINATION: CPT

## 2021-09-14 PROCEDURE — 99285 EMERGENCY DEPT VISIT HI MDM: CPT

## 2021-09-14 PROCEDURE — 86900 BLOOD TYPING SEROLOGIC ABO: CPT

## 2021-09-14 PROCEDURE — 700105 HCHG RX REV CODE 258: Performed by: INTERNAL MEDICINE

## 2021-09-14 PROCEDURE — 99220 PR INITIAL OBSERVATION CARE,LEVL III: CPT | Performed by: INTERNAL MEDICINE

## 2021-09-14 PROCEDURE — 700111 HCHG RX REV CODE 636 W/ 250 OVERRIDE (IP): Performed by: EMERGENCY MEDICINE

## 2021-09-14 PROCEDURE — 85025 COMPLETE CBC W/AUTO DIFF WBC: CPT

## 2021-09-14 PROCEDURE — 96366 THER/PROPH/DIAG IV INF ADDON: CPT

## 2021-09-14 PROCEDURE — 700101 HCHG RX REV CODE 250: Performed by: EMERGENCY MEDICINE

## 2021-09-14 PROCEDURE — 74176 CT ABD & PELVIS W/O CONTRAST: CPT

## 2021-09-14 PROCEDURE — 86850 RBC ANTIBODY SCREEN: CPT

## 2021-09-14 PROCEDURE — 85730 THROMBOPLASTIN TIME PARTIAL: CPT

## 2021-09-14 PROCEDURE — A9270 NON-COVERED ITEM OR SERVICE: HCPCS | Performed by: INTERNAL MEDICINE

## 2021-09-14 PROCEDURE — 82077 ASSAY SPEC XCP UR&BREATH IA: CPT

## 2021-09-14 PROCEDURE — 96372 THER/PROPH/DIAG INJ SC/IM: CPT

## 2021-09-14 PROCEDURE — 94760 N-INVAS EAR/PLS OXIMETRY 1: CPT

## 2021-09-14 PROCEDURE — 96375 TX/PRO/DX INJ NEW DRUG ADDON: CPT

## 2021-09-14 PROCEDURE — 83690 ASSAY OF LIPASE: CPT

## 2021-09-14 PROCEDURE — 36415 COLL VENOUS BLD VENIPUNCTURE: CPT

## 2021-09-14 PROCEDURE — 96367 TX/PROPH/DG ADDL SEQ IV INF: CPT

## 2021-09-14 PROCEDURE — 700111 HCHG RX REV CODE 636 W/ 250 OVERRIDE (IP): Performed by: INTERNAL MEDICINE

## 2021-09-14 PROCEDURE — 93005 ELECTROCARDIOGRAM TRACING: CPT | Performed by: EMERGENCY MEDICINE

## 2021-09-14 PROCEDURE — 84484 ASSAY OF TROPONIN QUANT: CPT

## 2021-09-14 PROCEDURE — A9270 NON-COVERED ITEM OR SERVICE: HCPCS | Performed by: EMERGENCY MEDICINE

## 2021-09-14 PROCEDURE — 700105 HCHG RX REV CODE 258: Performed by: EMERGENCY MEDICINE

## 2021-09-14 PROCEDURE — 81001 URINALYSIS AUTO W/SCOPE: CPT

## 2021-09-14 PROCEDURE — 86901 BLOOD TYPING SEROLOGIC RH(D): CPT

## 2021-09-14 PROCEDURE — 700102 HCHG RX REV CODE 250 W/ 637 OVERRIDE(OP): Performed by: INTERNAL MEDICINE

## 2021-09-14 RX ORDER — BISACODYL 10 MG
10 SUPPOSITORY, RECTAL RECTAL
Status: DISCONTINUED | OUTPATIENT
Start: 2021-09-14 | End: 2021-09-16 | Stop reason: HOSPADM

## 2021-09-14 RX ORDER — OXYCODONE HYDROCHLORIDE 5 MG/1
5 TABLET ORAL
Status: DISCONTINUED | OUTPATIENT
Start: 2021-09-14 | End: 2021-09-16 | Stop reason: HOSPADM

## 2021-09-14 RX ORDER — LORAZEPAM 1 MG/1
1 TABLET ORAL EVERY 4 HOURS PRN
Status: DISCONTINUED | OUTPATIENT
Start: 2021-09-14 | End: 2021-09-16 | Stop reason: HOSPADM

## 2021-09-14 RX ORDER — OXYCODONE HYDROCHLORIDE 5 MG/1
2.5 TABLET ORAL
Status: DISCONTINUED | OUTPATIENT
Start: 2021-09-14 | End: 2021-09-16 | Stop reason: HOSPADM

## 2021-09-14 RX ORDER — ATORVASTATIN CALCIUM 40 MG/1
40 TABLET, FILM COATED ORAL NIGHTLY
COMMUNITY

## 2021-09-14 RX ORDER — GAUZE BANDAGE 2" X 2"
100 BANDAGE TOPICAL DAILY
Status: DISCONTINUED | OUTPATIENT
Start: 2021-09-15 | End: 2021-09-16 | Stop reason: HOSPADM

## 2021-09-14 RX ORDER — ATORVASTATIN CALCIUM 40 MG/1
40 TABLET, FILM COATED ORAL NIGHTLY
Status: DISCONTINUED | OUTPATIENT
Start: 2021-09-14 | End: 2021-09-16 | Stop reason: HOSPADM

## 2021-09-14 RX ORDER — LISINOPRIL 10 MG/1
10 TABLET ORAL DAILY
COMMUNITY

## 2021-09-14 RX ORDER — PANTOPRAZOLE SODIUM 40 MG/10ML
40 INJECTION, POWDER, LYOPHILIZED, FOR SOLUTION INTRAVENOUS ONCE
Status: COMPLETED | OUTPATIENT
Start: 2021-09-14 | End: 2021-09-14

## 2021-09-14 RX ORDER — LORAZEPAM 2 MG/1
2 TABLET ORAL
Status: DISCONTINUED | OUTPATIENT
Start: 2021-09-14 | End: 2021-09-16 | Stop reason: HOSPADM

## 2021-09-14 RX ORDER — LORAZEPAM 2 MG/ML
1 INJECTION INTRAMUSCULAR
Status: DISCONTINUED | OUTPATIENT
Start: 2021-09-14 | End: 2021-09-16 | Stop reason: HOSPADM

## 2021-09-14 RX ORDER — FOLIC ACID 1 MG/1
1 TABLET ORAL DAILY
Status: DISCONTINUED | OUTPATIENT
Start: 2021-09-15 | End: 2021-09-16 | Stop reason: HOSPADM

## 2021-09-14 RX ORDER — SODIUM CHLORIDE, SODIUM LACTATE, POTASSIUM CHLORIDE, CALCIUM CHLORIDE 600; 310; 30; 20 MG/100ML; MG/100ML; MG/100ML; MG/100ML
INJECTION, SOLUTION INTRAVENOUS CONTINUOUS
Status: DISCONTINUED | OUTPATIENT
Start: 2021-09-14 | End: 2021-09-16 | Stop reason: HOSPADM

## 2021-09-14 RX ORDER — ONDANSETRON 2 MG/ML
4 INJECTION INTRAMUSCULAR; INTRAVENOUS EVERY 8 HOURS PRN
Status: DISCONTINUED | OUTPATIENT
Start: 2021-09-14 | End: 2021-09-16 | Stop reason: HOSPADM

## 2021-09-14 RX ORDER — HEPARIN SODIUM 5000 [USP'U]/ML
5000 INJECTION, SOLUTION INTRAVENOUS; SUBCUTANEOUS EVERY 8 HOURS
Status: DISCONTINUED | OUTPATIENT
Start: 2021-09-14 | End: 2021-09-15

## 2021-09-14 RX ORDER — DULAGLUTIDE 0.75 MG/.5ML
0.5 INJECTION, SOLUTION SUBCUTANEOUS
COMMUNITY

## 2021-09-14 RX ORDER — LORAZEPAM 2 MG/ML
1.5 INJECTION INTRAMUSCULAR
Status: DISCONTINUED | OUTPATIENT
Start: 2021-09-14 | End: 2021-09-16 | Stop reason: HOSPADM

## 2021-09-14 RX ORDER — NICOTINE 21 MG/24HR
14 PATCH, TRANSDERMAL 24 HOURS TRANSDERMAL
Status: DISCONTINUED | OUTPATIENT
Start: 2021-09-14 | End: 2021-09-16 | Stop reason: HOSPADM

## 2021-09-14 RX ORDER — KETOROLAC TROMETHAMINE 30 MG/ML
30 INJECTION, SOLUTION INTRAMUSCULAR; INTRAVENOUS ONCE
Status: COMPLETED | OUTPATIENT
Start: 2021-09-14 | End: 2021-09-14

## 2021-09-14 RX ORDER — LISINOPRIL 10 MG/1
10 TABLET ORAL DAILY
Status: DISCONTINUED | OUTPATIENT
Start: 2021-09-14 | End: 2021-09-16 | Stop reason: HOSPADM

## 2021-09-14 RX ORDER — ONDANSETRON 2 MG/ML
4 INJECTION INTRAMUSCULAR; INTRAVENOUS ONCE
Status: COMPLETED | OUTPATIENT
Start: 2021-09-14 | End: 2021-09-14

## 2021-09-14 RX ORDER — HYDROMORPHONE HYDROCHLORIDE 1 MG/ML
0.5 INJECTION, SOLUTION INTRAMUSCULAR; INTRAVENOUS; SUBCUTANEOUS ONCE
Status: COMPLETED | OUTPATIENT
Start: 2021-09-14 | End: 2021-09-14

## 2021-09-14 RX ORDER — POLYETHYLENE GLYCOL 3350 17 G/17G
1 POWDER, FOR SOLUTION ORAL
Status: DISCONTINUED | OUTPATIENT
Start: 2021-09-14 | End: 2021-09-16 | Stop reason: HOSPADM

## 2021-09-14 RX ORDER — LORAZEPAM 2 MG/ML
1 INJECTION INTRAMUSCULAR ONCE
Status: COMPLETED | OUTPATIENT
Start: 2021-09-14 | End: 2021-09-14

## 2021-09-14 RX ORDER — SODIUM CHLORIDE 9 MG/ML
1000 INJECTION, SOLUTION INTRAVENOUS ONCE
Status: COMPLETED | OUTPATIENT
Start: 2021-09-14 | End: 2021-09-14

## 2021-09-14 RX ORDER — AMOXICILLIN 250 MG
2 CAPSULE ORAL 2 TIMES DAILY
Status: DISCONTINUED | OUTPATIENT
Start: 2021-09-14 | End: 2021-09-16 | Stop reason: HOSPADM

## 2021-09-14 RX ORDER — LORAZEPAM 2 MG/ML
0.5 INJECTION INTRAMUSCULAR EVERY 4 HOURS PRN
Status: DISCONTINUED | OUTPATIENT
Start: 2021-09-14 | End: 2021-09-16 | Stop reason: HOSPADM

## 2021-09-14 RX ORDER — HYDROMORPHONE HYDROCHLORIDE 1 MG/ML
0.25 INJECTION, SOLUTION INTRAMUSCULAR; INTRAVENOUS; SUBCUTANEOUS
Status: DISCONTINUED | OUTPATIENT
Start: 2021-09-14 | End: 2021-09-16 | Stop reason: HOSPADM

## 2021-09-14 RX ORDER — DEXTROSE MONOHYDRATE 25 G/50ML
50 INJECTION, SOLUTION INTRAVENOUS
Status: DISCONTINUED | OUTPATIENT
Start: 2021-09-14 | End: 2021-09-16 | Stop reason: HOSPADM

## 2021-09-14 RX ORDER — LORAZEPAM 2 MG/1
4 TABLET ORAL
Status: DISCONTINUED | OUTPATIENT
Start: 2021-09-14 | End: 2021-09-16 | Stop reason: HOSPADM

## 2021-09-14 RX ORDER — FAMOTIDINE 20 MG/1
20 TABLET, FILM COATED ORAL 2 TIMES DAILY
Status: DISCONTINUED | OUTPATIENT
Start: 2021-09-14 | End: 2021-09-16 | Stop reason: HOSPADM

## 2021-09-14 RX ORDER — LORAZEPAM 0.5 MG/1
0.5 TABLET ORAL EVERY 4 HOURS PRN
Status: DISCONTINUED | OUTPATIENT
Start: 2021-09-14 | End: 2021-09-16 | Stop reason: HOSPADM

## 2021-09-14 RX ORDER — LABETALOL HYDROCHLORIDE 5 MG/ML
10 INJECTION, SOLUTION INTRAVENOUS EVERY 4 HOURS PRN
Status: DISCONTINUED | OUTPATIENT
Start: 2021-09-14 | End: 2021-09-15

## 2021-09-14 RX ORDER — LORAZEPAM 2 MG/ML
2 INJECTION INTRAMUSCULAR
Status: DISCONTINUED | OUTPATIENT
Start: 2021-09-14 | End: 2021-09-16 | Stop reason: HOSPADM

## 2021-09-14 RX ADMIN — ONDANSETRON 4 MG: 2 INJECTION INTRAMUSCULAR; INTRAVENOUS at 15:46

## 2021-09-14 RX ADMIN — SODIUM CHLORIDE, POTASSIUM CHLORIDE, SODIUM LACTATE AND CALCIUM CHLORIDE: 600; 310; 30; 20 INJECTION, SOLUTION INTRAVENOUS at 17:15

## 2021-09-14 RX ADMIN — HEPARIN SODIUM 5000 UNITS: 5000 INJECTION, SOLUTION INTRAVENOUS; SUBCUTANEOUS at 21:49

## 2021-09-14 RX ADMIN — INSULIN HUMAN 3 UNITS: 100 INJECTION, SOLUTION PARENTERAL at 21:47

## 2021-09-14 RX ADMIN — OXYCODONE 5 MG: 5 TABLET ORAL at 21:50

## 2021-09-14 RX ADMIN — THIAMINE HYDROCHLORIDE: 100 INJECTION, SOLUTION INTRAMUSCULAR; INTRAVENOUS at 15:17

## 2021-09-14 RX ADMIN — PANTOPRAZOLE SODIUM 40 MG: 40 INJECTION, POWDER, LYOPHILIZED, FOR SOLUTION INTRAVENOUS at 12:00

## 2021-09-14 RX ADMIN — LORAZEPAM 0.5 MG: 1 TABLET ORAL at 21:48

## 2021-09-14 RX ADMIN — SODIUM CHLORIDE 1000 ML: 9 INJECTION, SOLUTION INTRAVENOUS at 14:15

## 2021-09-14 RX ADMIN — NICOTINE 14 MG: 14 PATCH TRANSDERMAL at 17:19

## 2021-09-14 RX ADMIN — FAMOTIDINE 20 MG: 20 TABLET ORAL at 21:31

## 2021-09-14 RX ADMIN — SODIUM CHLORIDE 1000 ML: 9 INJECTION, SOLUTION INTRAVENOUS at 12:01

## 2021-09-14 RX ADMIN — ATORVASTATIN CALCIUM 40 MG: 40 TABLET, FILM COATED ORAL at 21:48

## 2021-09-14 RX ADMIN — LIDOCAINE HYDROCHLORIDE 30 ML: 20 SOLUTION OROPHARYNGEAL at 12:16

## 2021-09-14 RX ADMIN — KETOROLAC TROMETHAMINE 30 MG: 30 INJECTION, SOLUTION INTRAMUSCULAR; INTRAVENOUS at 14:35

## 2021-09-14 RX ADMIN — ONDANSETRON 4 MG: 2 INJECTION INTRAMUSCULAR; INTRAVENOUS at 11:59

## 2021-09-14 RX ADMIN — LORAZEPAM 1 MG: 2 INJECTION INTRAMUSCULAR; INTRAVENOUS at 15:17

## 2021-09-14 RX ADMIN — LISINOPRIL 10 MG: 10 TABLET ORAL at 17:19

## 2021-09-14 RX ADMIN — HEPARIN SODIUM 5000 UNITS: 5000 INJECTION, SOLUTION INTRAVENOUS; SUBCUTANEOUS at 17:19

## 2021-09-14 RX ADMIN — INSULIN HUMAN 6 UNITS: 100 INJECTION, SOLUTION PARENTERAL at 17:22

## 2021-09-14 RX ADMIN — INSULIN HUMAN 8 UNITS: 100 INJECTION, SOLUTION PARENTERAL at 15:11

## 2021-09-14 RX ADMIN — HYDROMORPHONE HYDROCHLORIDE 0.5 MG: 1 INJECTION, SOLUTION INTRAMUSCULAR; INTRAVENOUS; SUBCUTANEOUS at 11:59

## 2021-09-14 ASSESSMENT — LIFESTYLE VARIABLES
DO YOU DRINK ALCOHOL: NO
VISUAL DISTURBANCES: NOT PRESENT
ANXIETY: *
PAROXYSMAL SWEATS: BARELY PERCEPTIBLE SWEATING. PALMS MOIST
AGITATION: NORMAL ACTIVITY
TOTAL SCORE: VERY MILD ITCHING, PINS AND NEEDLES SENSATION, BURNING OR NUMBNESS
NAUSEA AND VOMITING: NO NAUSEA AND NO VOMITING
TOTAL SCORE: 5
HEADACHE, FULLNESS IN HEAD: NOT PRESENT
SUBSTANCE_ABUSE: 0
AUDITORY DISTURBANCES: NOT PRESENT
TREMOR: TREMOR NOT VISIBLE BUT CAN BE FELT, FINGERTIP TO FINGERTIP
ORIENTATION AND CLOUDING OF SENSORIUM: ORIENTED AND CAN DO SERIAL ADDITIONS

## 2021-09-14 ASSESSMENT — ENCOUNTER SYMPTOMS
HEARTBURN: 0
CHILLS: 0
BLURRED VISION: 0
FEVER: 0
SHORTNESS OF BREATH: 0
FALLS: 1
NERVOUS/ANXIOUS: 0
DIZZINESS: 0
VOMITING: 1
HEADACHES: 0
DOUBLE VISION: 0
NAUSEA: 1
ABDOMINAL PAIN: 0
COUGH: 0
PALPITATIONS: 0

## 2021-09-14 ASSESSMENT — PAIN DESCRIPTION - PAIN TYPE
TYPE: ACUTE PAIN
TYPE: ACUTE PAIN

## 2021-09-14 ASSESSMENT — FIBROSIS 4 INDEX: FIB4 SCORE: 3.47

## 2021-09-14 NOTE — ED NOTES
"Blood drawn from present IV.  Patient writhing in pain, stating that \"either my kidney or my liver feels like it's going to explode.\"  Vitals taken, blood sent  "

## 2021-09-14 NOTE — ED NOTES
Patient reports unable to provide urine, straight cathed, 400 ml clear hetal urine emptied from bladder.

## 2021-09-14 NOTE — ED PROVIDER NOTES
"ED Provider Note    Scribed for Clemente Craft M.D. by Brian Flood. 9/14/2021, 11:32 AM.    Primary care provider: Pcp Pt States None  Means of arrival: EMS  History obtained from: Patient  History limited by: None    CHIEF COMPLAINT  Chief Complaint   Patient presents with   • Flank Pain     pt states he was here a few days ago for right flank pain and sent home, states since then the pain has got worse   • N/V     pt states hes been vomiting off and on for a couple days as well, states hes noticed some blood in it   • Blood in Vomit       HPI  Artem Weinstein is a 60 y.o. male who presents to the Emergency Department for intermittent stabbing cramps and spasms on his right flank onset three days ago. The pain is exacerbated by standing/movement. He states that \"it feels like a broken rib\" and that the pain is so bad that he cannot walk or drive his car. He was in the ED a few days ago for a ground level fall. He has associated symptoms of vomiting, he notes his vomit was dark, shortness of breath secondary to the pain, and heartburn. He denies any hematuria or dysuria. He has a history of type 2 diabetes and gets heartburn when he does not take Pepcid. He notes that he has not taken Pepcid recently.   Patient is a regular alcohol drinker and he states his last drink was about a day to a day and a half ago.    REVIEW OF SYSTEMS  Pertinent positives include stabbing cramps and spasm on his left flank, vomiting, shortness of breath secondary to the pain, and heartburn. Pertinent negatives include no hematuria or dysuria.  All other systems reviewed and negative.    PAST MEDICAL HISTORY   has a past medical history of Diabetes (HCC) and Renal disorder.    SURGICAL HISTORY  patient denies any surgical history    SOCIAL HISTORY  Social History     Tobacco Use   • Smoking status: Current Every Day Smoker     Packs/day: 0.00     Types: Cigarettes   • Smokeless tobacco: Never Used   Substance Use Topics   • Alcohol " use: Yes     Comment: occasionally   • Drug use: No      Social History     Substance and Sexual Activity   Drug Use No       FAMILY HISTORY  History reviewed. No pertinent family history.    CURRENT MEDICATIONS  Home Medications     Reviewed by Alyssa Raygoza R.N. (Registered Nurse) on 09/14/21 at 0607  Med List Status: Partial   Medication Last Dose Status   famotidine (PEPCID) 20 MG Tab  Active   ondansetron (ZOFRAN ODT) 4 MG TABLET DISPERSIBLE  Active                ALLERGIES  No Known Allergies    PHYSICAL EXAM  VITAL SIGNS: /88   Pulse (!) 116   Temp 36.3 °C (97.3 °F) (Temporal)   Resp 18   SpO2 95%     Constitutional: Well developed, Well nourished, Mild to moderate distress, Non-toxic appearance.   HENT: Normocephalic, Atraumatic, TMs normal, mucous membranes moist, no erythema, exudates, swelling, or masses, nares patent  Eyes: nonicteric  Neck: Supple, no meningismus  Lymphatic: No lymphadenopathy noted.   Cardiovascular: Regular rate and rhythm, no gallops rubs or murmurs  Lungs: Clear bilaterally   Abdomen: Can feel movement of lower rib on the right. Do not feel any soft tissue gas. Bowel sounds normal, Soft, No tenderness  Skin: Warm, Dry, no rash  Back: No tenderness, No CVA tenderness.   Genitalia: Deferred  Rectal: Deferred  Extremities: No edema  Neurologic: Alert, appropriate, follows commands, moving all extremities, normal speech   Psychiatric: Affect normal      DIAGNOSTIC STUDIES / PROCEDURES    LABS  Results for orders placed or performed during the hospital encounter of 09/14/21   CBC WITH DIFFERENTIAL   Result Value Ref Range    WBC 11.0 (H) 4.8 - 10.8 K/uL    RBC 5.79 4.70 - 6.10 M/uL    Hemoglobin 17.2 14.0 - 18.0 g/dL    Hematocrit 47.2 42.0 - 52.0 %    MCV 81.5 81.4 - 97.8 fL    MCH 29.7 27.0 - 33.0 pg    MCHC 36.4 (H) 33.7 - 35.3 g/dL    RDW 37.3 35.9 - 50.0 fL    Platelet Count 127 (L) 164 - 446 K/uL    MPV 10.0 9.0 - 12.9 fL    Neutrophils-Polys 83.20 (H) 44.00 -  72.00 %    Lymphocytes 10.50 (L) 22.00 - 41.00 %    Monocytes 5.60 0.00 - 13.40 %    Eosinophils 0.10 0.00 - 6.90 %    Basophils 0.20 0.00 - 1.80 %    Immature Granulocytes 0.40 0.00 - 0.90 %    Nucleated RBC 0.00 /100 WBC    Neutrophils (Absolute) 9.13 (H) 1.82 - 7.42 K/uL    Lymphs (Absolute) 1.15 1.00 - 4.80 K/uL    Monos (Absolute) 0.61 0.00 - 0.85 K/uL    Eos (Absolute) 0.01 0.00 - 0.51 K/uL    Baso (Absolute) 0.02 0.00 - 0.12 K/uL    Immature Granulocytes (abs) 0.04 0.00 - 0.11 K/uL    NRBC (Absolute) 0.00 K/uL   COMP METABOLIC PANEL   Result Value Ref Range    Sodium 127 (L) 135 - 145 mmol/L    Potassium 4.4 3.6 - 5.5 mmol/L    Chloride 85 (L) 96 - 112 mmol/L    Co2 19 (L) 20 - 33 mmol/L    Anion Gap 23.0 (H) 7.0 - 16.0    Glucose 345 (H) 65 - 99 mg/dL    Bun 17 8 - 22 mg/dL    Creatinine 0.75 0.50 - 1.40 mg/dL    Calcium 10.5 8.5 - 10.5 mg/dL    AST(SGOT) 50 (H) 12 - 45 U/L    ALT(SGPT) 48 2 - 50 U/L    Alkaline Phosphatase 139 (H) 30 - 99 U/L    Total Bilirubin 0.6 0.1 - 1.5 mg/dL    Albumin 3.8 3.2 - 4.9 g/dL    Total Protein 6.3 6.0 - 8.2 g/dL    Globulin 2.5 1.9 - 3.5 g/dL    A-G Ratio 1.5 g/dL   LIPASE   Result Value Ref Range    Lipase 32 11 - 82 U/L   ESTIMATED GFR   Result Value Ref Range    GFR If African American >60 >60 mL/min/1.73 m 2    GFR If Non African American >60 >60 mL/min/1.73 m 2   VENOUS BLOOD GAS   Result Value Ref Range    Venous Bg Ph 7.39 7.31 - 7.45    Venous Bg Pco2 27.3 (L) 41.0 - 51.0 mmHg    Venous Bg Po2 67.9 (H) 25.0 - 40.0 mmHg    Venous Bg O2 Saturation 91.5 %    Venous Bg Hco3 16 (L) 24 - 28 mmol/L    Venous Bg Base Excess -7 mmol/L    Body Temp see below Centigrade   EKG (NOW)   Result Value Ref Range    Report       [facility] Emergency Dept.    Test Date:  2021  Pt Name:    SAGE KWOKLESS            Department: ER  MRN:        [id number]                      Room:        09  Gender:     Male                         Technician: [id number]  :         1960                   Requested By:CORAL PERALES  Order #:    [id number]                    Reading MD:    Measurements  Intervals                                Axis  Rate:       105                          P:          88  CT:         128                          QRS:        76  QRSD:       78                           T:          67  QT:         380  QTc:        503    Interpretive Statements  SINUS TACHYCARDIA  PROBABLE LEFT ATRIAL ABNORMALITY  LOW VOLTAGE IN FRONTAL LEADS  PROLONGED QT INTERVAL  BASELINE WANDER IN LEAD(S) II,III,aVF,V3,V5  Compared to ECG 11/15/2019 06:50:11  Sinus rhythm no longer present  ST (T wave) deviation no longer present         All labs reviewed by me.    EKG  Obtained at 12:07 PM.   Sinus rhythm tachycardic  Rate 105  Axis normal   Intervals normal   No ST segment elevation or depression.     Prologend Q-T interval and sinus tachycardia.      RADIOLOGY  CT-CHEST,ABDOMEN,PELVIS W/O   Final Result      1.  No acute inflammatory process in the chest, abdomen, or pelvis on this noncontrast CT.   2.  Punctate, nonobstructing bilateral renal stones.   3.  Hepatic steatosis.   4.  Smooth pleural thickening at the posterior right lower lobe measuring up to 1.6 cm.   5.   Low and High Risk: Consider CT, PET/CT, or tissue sampling at 3 months      Low Risk - Minimal or absent history of smoking and of other known risk factors.      High Risk - History of smoking or of other known risk factors.      Note: These recommendations do not apply to lung cancer screening, patients with immunosuppression, or patients with known primary cancer.      Fleischner Society 2017 Guidelines for Management of Incidentally Detected Pulmonary Nodules in Adults           The radiologist's interpretation of all radiological studies have been reviewed by me.    COURSE & MEDICAL DECISION MAKING  Nursing notes, VS, PMSFHx reviewed in chart.     11:32 AM Patient seen and examined at bedside. Ordered for EKG,  CT-Chest, Venous Blood Gas, CBC with Differential, CMP, APTT, Prothrombin, COD, Troponin, Estimated GFR Lipase, and Urinalysis to evaluate. Patient was treated with Dilaudid injection 0.5 mg, Zofran injection 4 mg, Gi Cocktail 30 mL. Protonix injection 40 mg, and NS infusion 1,000 mL for his symptoms.     HYDRATION: Based on the patient's presentation of Tachycardia the patient was given IV fluids. IV Hydration was used because oral hydration was not adequate alone. Upon recheck following hydration, the patient was feeling slightly improved.    Decision Making:  This is a 60 y.o. year old male who presents with a complaint of flank pain and vomiting.  He is an alcoholic and clinically at this point appears to be in withdrawals.  He reports vomiting blood but has not vomited anything here.  Hemoglobin is 17.  He does appear to have a gap acidosis with ketones but pH on VBG is normal.  He appears dehydrated-he has large ketones in his urine.  He has remained tachycardic despite fluids.  He also appears to likely have a clinical rib fracture but no evidence of pneumothorax or radiographic evidence of rib fracture on CT.  Patient otherwise has electrolyte derangement including a sodium of 127-this was 137 4 days ago.  Given his hyponatremia, persistent vomiting in the context of diabetes with sugars in the 300 range, I feel he would benefit from admission for treatment of withdrawals, fluid resuscitation and treatment of nausea and pain control.    FINAL IMPRESSION  1. Nausea and vomiting, intractability of vomiting not specified, unspecified vomiting type    2. Rib pain    3. Hyponatremia    4. Alcohol withdrawal syndrome with complication (HCC)    5. Urinary retention          Brian VILLAVICENCIO), am scribing for, and in the presence of, Clemente Craft M.D..    Electronically signed by: Brian Kendrick), 9/14/2021    Clemente VILLAVICENCIO M.D. personally performed the services described in this documentation, as  scribed by Brian Flood in my presence, and it is both accurate and complete. C.     The note accurately reflects work and decisions made by me.  Clemente Craft M.D.  9/14/2021  3:26 PM

## 2021-09-14 NOTE — ED NOTES
Patient educated on need for urine collection, verbalized understanding, reports unable to provide at this time, urinal at bedside.

## 2021-09-14 NOTE — ED TRIAGE NOTES
Chief Complaint   Patient presents with   • Flank Pain     pt states he was here a few days ago for right flank pain and sent home, states since then the pain has got worse   • N/V     pt states hes been vomiting off and on for a couple days as well, states hes noticed some blood in it   • Blood in Vomit       Pt in wheelchair to triage. Pt alert and oriented. Pt educated on triage process and to update staff if any changes. Pt placed back into lobby.

## 2021-09-14 NOTE — ED NOTES
Pt to R9 via wheelchair.  Agree with triage assessment.  Pt changed into gown.  Chart up for ERP.

## 2021-09-14 NOTE — ED NOTES
Med rec complete per pt and [facility]. NKDA. All meds were last filled on 6/11/21 for 90 days

## 2021-09-14 NOTE — ED NOTES
Patient medicated per ERP orders, see MAR.  IVF infusing, pateint resting on gurney, respirations even and unlabored.

## 2021-09-15 PROBLEM — E83.39 HYPOPHOSPHATEMIA: Status: ACTIVE | Noted: 2021-09-15

## 2021-09-15 PROBLEM — E87.29 HIGH ANION GAP METABOLIC ACIDOSIS: Status: ACTIVE | Noted: 2021-09-15

## 2021-09-15 PROBLEM — F10.20 ALCOHOL USE DISORDER, MODERATE, DEPENDENCE (HCC): Status: ACTIVE | Noted: 2021-09-14

## 2021-09-15 PROBLEM — S20.211A CONTUSION OF RIB ON RIGHT SIDE: Status: ACTIVE | Noted: 2021-09-14

## 2021-09-15 PROBLEM — R29.6 MULTIPLE FALLS: Status: ACTIVE | Noted: 2021-09-15

## 2021-09-15 PROBLEM — K70.10 ALCOHOLIC HEPATITIS WITHOUT ASCITES: Status: ACTIVE | Noted: 2021-09-14

## 2021-09-15 PROBLEM — R27.0 ATAXIA: Status: ACTIVE | Noted: 2021-09-15

## 2021-09-15 LAB
ALBUMIN SERPL BCP-MCNC: 2.9 G/DL (ref 3.2–4.9)
ALBUMIN/GLOB SERPL: 1.5 G/DL
ALP SERPL-CCNC: 106 U/L (ref 30–99)
ALT SERPL-CCNC: 32 U/L (ref 2–50)
ANION GAP SERPL CALC-SCNC: 12 MMOL/L (ref 7–16)
AST SERPL-CCNC: 35 U/L (ref 12–45)
BASOPHILS # BLD AUTO: 0.2 % (ref 0–1.8)
BASOPHILS # BLD: 0.01 K/UL (ref 0–0.12)
BILIRUB SERPL-MCNC: 0.6 MG/DL (ref 0.1–1.5)
BUN SERPL-MCNC: 18 MG/DL (ref 8–22)
CALCIUM SERPL-MCNC: 7.8 MG/DL (ref 8.5–10.5)
CHLORIDE SERPL-SCNC: 94 MMOL/L (ref 96–112)
CO2 SERPL-SCNC: 25 MMOL/L (ref 20–33)
CREAT SERPL-MCNC: 0.56 MG/DL (ref 0.5–1.4)
EOSINOPHIL # BLD AUTO: 0.05 K/UL (ref 0–0.51)
EOSINOPHIL NFR BLD: 1 % (ref 0–6.9)
ERYTHROCYTE [DISTWIDTH] IN BLOOD BY AUTOMATED COUNT: 39 FL (ref 35.9–50)
GLOBULIN SER CALC-MCNC: 1.9 G/DL (ref 1.9–3.5)
GLUCOSE BLD-MCNC: 194 MG/DL (ref 65–99)
GLUCOSE BLD-MCNC: 211 MG/DL (ref 65–99)
GLUCOSE BLD-MCNC: 243 MG/DL (ref 65–99)
GLUCOSE BLD-MCNC: 275 MG/DL (ref 65–99)
GLUCOSE SERPL-MCNC: 280 MG/DL (ref 65–99)
HCT VFR BLD AUTO: 37.2 % (ref 42–52)
HGB BLD-MCNC: 13.2 G/DL (ref 14–18)
IMM GRANULOCYTES # BLD AUTO: 0.03 K/UL (ref 0–0.11)
IMM GRANULOCYTES NFR BLD AUTO: 0.6 % (ref 0–0.9)
LYMPHOCYTES # BLD AUTO: 1.3 K/UL (ref 1–4.8)
LYMPHOCYTES NFR BLD: 25.3 % (ref 22–41)
MAGNESIUM SERPL-MCNC: 1.6 MG/DL (ref 1.5–2.5)
MCH RBC QN AUTO: 29.6 PG (ref 27–33)
MCHC RBC AUTO-ENTMCNC: 35.5 G/DL (ref 33.7–35.3)
MCV RBC AUTO: 83.4 FL (ref 81.4–97.8)
MONOCYTES # BLD AUTO: 0.26 K/UL (ref 0–0.85)
MONOCYTES NFR BLD AUTO: 5.1 % (ref 0–13.4)
NEUTROPHILS # BLD AUTO: 3.48 K/UL (ref 1.82–7.42)
NEUTROPHILS NFR BLD: 67.8 % (ref 44–72)
NRBC # BLD AUTO: 0 K/UL
NRBC BLD-RTO: 0 /100 WBC
PHOSPHATE SERPL-MCNC: 1 MG/DL (ref 2.5–4.5)
PLATELET # BLD AUTO: 63 K/UL (ref 164–446)
PMV BLD AUTO: 10 FL (ref 9–12.9)
POTASSIUM SERPL-SCNC: 3.3 MMOL/L (ref 3.6–5.5)
PROT SERPL-MCNC: 4.8 G/DL (ref 6–8.2)
RBC # BLD AUTO: 4.46 M/UL (ref 4.7–6.1)
SODIUM SERPL-SCNC: 131 MMOL/L (ref 135–145)
WBC # BLD AUTO: 5.1 K/UL (ref 4.8–10.8)

## 2021-09-15 PROCEDURE — 96372 THER/PROPH/DIAG INJ SC/IM: CPT

## 2021-09-15 PROCEDURE — 85025 COMPLETE CBC W/AUTO DIFF WBC: CPT

## 2021-09-15 PROCEDURE — 700101 HCHG RX REV CODE 250: Performed by: STUDENT IN AN ORGANIZED HEALTH CARE EDUCATION/TRAINING PROGRAM

## 2021-09-15 PROCEDURE — 97161 PT EVAL LOW COMPLEX 20 MIN: CPT

## 2021-09-15 PROCEDURE — 700102 HCHG RX REV CODE 250 W/ 637 OVERRIDE(OP): Performed by: STUDENT IN AN ORGANIZED HEALTH CARE EDUCATION/TRAINING PROGRAM

## 2021-09-15 PROCEDURE — G0378 HOSPITAL OBSERVATION PER HR: HCPCS

## 2021-09-15 PROCEDURE — A9270 NON-COVERED ITEM OR SERVICE: HCPCS | Performed by: STUDENT IN AN ORGANIZED HEALTH CARE EDUCATION/TRAINING PROGRAM

## 2021-09-15 PROCEDURE — 82962 GLUCOSE BLOOD TEST: CPT

## 2021-09-15 PROCEDURE — 84100 ASSAY OF PHOSPHORUS: CPT

## 2021-09-15 PROCEDURE — 80053 COMPREHEN METABOLIC PANEL: CPT

## 2021-09-15 PROCEDURE — A9270 NON-COVERED ITEM OR SERVICE: HCPCS | Performed by: INTERNAL MEDICINE

## 2021-09-15 PROCEDURE — 700111 HCHG RX REV CODE 636 W/ 250 OVERRIDE (IP): Performed by: INTERNAL MEDICINE

## 2021-09-15 PROCEDURE — 96366 THER/PROPH/DIAG IV INF ADDON: CPT

## 2021-09-15 PROCEDURE — 700105 HCHG RX REV CODE 258: Performed by: INTERNAL MEDICINE

## 2021-09-15 PROCEDURE — 99225 PR SUBSEQUENT OBSERVATION CARE,LEVEL II: CPT | Performed by: STUDENT IN AN ORGANIZED HEALTH CARE EDUCATION/TRAINING PROGRAM

## 2021-09-15 PROCEDURE — 97165 OT EVAL LOW COMPLEX 30 MIN: CPT

## 2021-09-15 PROCEDURE — 83735 ASSAY OF MAGNESIUM: CPT

## 2021-09-15 PROCEDURE — 96367 TX/PROPH/DG ADDL SEQ IV INF: CPT

## 2021-09-15 PROCEDURE — 700105 HCHG RX REV CODE 258: Performed by: STUDENT IN AN ORGANIZED HEALTH CARE EDUCATION/TRAINING PROGRAM

## 2021-09-15 PROCEDURE — 700102 HCHG RX REV CODE 250 W/ 637 OVERRIDE(OP): Performed by: INTERNAL MEDICINE

## 2021-09-15 RX ORDER — POTASSIUM CHLORIDE 20 MEQ/1
40 TABLET, EXTENDED RELEASE ORAL
Status: DISPENSED | OUTPATIENT
Start: 2021-09-15 | End: 2021-09-15

## 2021-09-15 RX ORDER — CYCLOBENZAPRINE HCL 10 MG
10 TABLET ORAL 3 TIMES DAILY PRN
Status: DISCONTINUED | OUTPATIENT
Start: 2021-09-15 | End: 2021-09-16 | Stop reason: HOSPADM

## 2021-09-15 RX ADMIN — NICOTINE 14 MG: 14 PATCH TRANSDERMAL at 05:54

## 2021-09-15 RX ADMIN — DOCUSATE SODIUM 50 MG AND SENNOSIDES 8.6 MG 2 TABLET: 8.6; 5 TABLET, FILM COATED ORAL at 18:02

## 2021-09-15 RX ADMIN — OXYCODONE 5 MG: 5 TABLET ORAL at 05:58

## 2021-09-15 RX ADMIN — INSULIN HUMAN 5 UNITS: 100 INJECTION, SOLUTION PARENTERAL at 09:36

## 2021-09-15 RX ADMIN — OXYCODONE 5 MG: 5 TABLET ORAL at 08:56

## 2021-09-15 RX ADMIN — Medication 100 MG: at 05:31

## 2021-09-15 RX ADMIN — INSULIN HUMAN 3 UNITS: 100 INJECTION, SOLUTION PARENTERAL at 17:54

## 2021-09-15 RX ADMIN — OXYCODONE 5 MG: 5 TABLET ORAL at 02:35

## 2021-09-15 RX ADMIN — ATORVASTATIN CALCIUM 40 MG: 40 TABLET, FILM COATED ORAL at 20:00

## 2021-09-15 RX ADMIN — CYCLOBENZAPRINE 10 MG: 10 TABLET, FILM COATED ORAL at 20:52

## 2021-09-15 RX ADMIN — FAMOTIDINE 20 MG: 20 TABLET ORAL at 18:02

## 2021-09-15 RX ADMIN — THERA TABS 1 TABLET: TAB at 05:31

## 2021-09-15 RX ADMIN — FOLIC ACID 1 MG: 1 TABLET ORAL at 05:54

## 2021-09-15 RX ADMIN — HEPARIN SODIUM 5000 UNITS: 5000 INJECTION, SOLUTION INTRAVENOUS; SUBCUTANEOUS at 08:25

## 2021-09-15 RX ADMIN — INSULIN HUMAN 2 UNITS: 100 INJECTION, SOLUTION PARENTERAL at 14:33

## 2021-09-15 RX ADMIN — SODIUM PHOSPHATE, MONOBASIC, MONOHYDRATE 30 MMOL: 276; 142 INJECTION, SOLUTION INTRAVENOUS at 05:46

## 2021-09-15 RX ADMIN — INSULIN HUMAN 3 UNITS: 100 INJECTION, SOLUTION PARENTERAL at 20:07

## 2021-09-15 RX ADMIN — FAMOTIDINE 20 MG: 20 TABLET ORAL at 05:32

## 2021-09-15 RX ADMIN — POTASSIUM CHLORIDE 40 MEQ: 1500 TABLET, EXTENDED RELEASE ORAL at 05:31

## 2021-09-15 RX ADMIN — SODIUM CHLORIDE, POTASSIUM CHLORIDE, SODIUM LACTATE AND CALCIUM CHLORIDE: 600; 310; 30; 20 INJECTION, SOLUTION INTRAVENOUS at 05:50

## 2021-09-15 RX ADMIN — OXYCODONE 5 MG: 5 TABLET ORAL at 20:01

## 2021-09-15 RX ADMIN — LISINOPRIL 10 MG: 10 TABLET ORAL at 05:32

## 2021-09-15 ASSESSMENT — LIFESTYLE VARIABLES
SUBSTANCE_ABUSE: 1
ANXIETY: MILDLY ANXIOUS
PAROXYSMAL SWEATS: NO SWEAT VISIBLE
AUDITORY DISTURBANCES: NOT PRESENT
PAROXYSMAL SWEATS: NO SWEAT VISIBLE
PAROXYSMAL SWEATS: NO SWEAT VISIBLE
TREMOR: NO TREMOR
ANXIETY: MILDLY ANXIOUS
NAUSEA AND VOMITING: NO NAUSEA AND NO VOMITING
AGITATION: NORMAL ACTIVITY
VISUAL DISTURBANCES: NOT PRESENT
TREMOR: NO TREMOR
HEADACHE, FULLNESS IN HEAD: NOT PRESENT
TOTAL SCORE: 0
TOTAL SCORE: 2
HEADACHE, FULLNESS IN HEAD: NOT PRESENT
VISUAL DISTURBANCES: NOT PRESENT
NAUSEA AND VOMITING: NO NAUSEA AND NO VOMITING
AGITATION: NORMAL ACTIVITY
HEADACHE, FULLNESS IN HEAD: NOT PRESENT
ORIENTATION AND CLOUDING OF SENSORIUM: ORIENTED AND CAN DO SERIAL ADDITIONS
ANXIETY: NO ANXIETY (AT EASE)
AGITATION: SOMEWHAT MORE THAN NORMAL ACTIVITY
NAUSEA AND VOMITING: NO NAUSEA AND NO VOMITING
AUDITORY DISTURBANCES: NOT PRESENT
VISUAL DISTURBANCES: NOT PRESENT
ORIENTATION AND CLOUDING OF SENSORIUM: ORIENTED AND CAN DO SERIAL ADDITIONS
TOTAL SCORE: 1
AUDITORY DISTURBANCES: NOT PRESENT
TREMOR: NO TREMOR
ORIENTATION AND CLOUDING OF SENSORIUM: ORIENTED AND CAN DO SERIAL ADDITIONS

## 2021-09-15 ASSESSMENT — COGNITIVE AND FUNCTIONAL STATUS - GENERAL
DAILY ACTIVITIY SCORE: 24
SUGGESTED CMS G CODE MODIFIER MOBILITY: CJ
MOBILITY SCORE: 21
MOVING TO AND FROM BED TO CHAIR: A LITTLE
TURNING FROM BACK TO SIDE WHILE IN FLAT BAD: A LITTLE
SUGGESTED CMS G CODE MODIFIER DAILY ACTIVITY: CH
CLIMB 3 TO 5 STEPS WITH RAILING: A LITTLE

## 2021-09-15 ASSESSMENT — ENCOUNTER SYMPTOMS
LOSS OF CONSCIOUSNESS: 0
HEMOPTYSIS: 0
HEADACHES: 0
EYE PAIN: 0
ABDOMINAL PAIN: 0
MYALGIAS: 1
VOMITING: 0
DIARRHEA: 0
SORE THROAT: 0
WEAKNESS: 1
CHILLS: 0
NERVOUS/ANXIOUS: 0
FLANK PAIN: 0
DIZZINESS: 0
NECK PAIN: 0
BRUISES/BLEEDS EASILY: 0
DEPRESSION: 0
SHORTNESS OF BREATH: 0
TREMORS: 1
FALLS: 1
SEIZURES: 0
BACK PAIN: 0
FEVER: 0
BLOOD IN STOOL: 0
CONSTIPATION: 0
SINUS PAIN: 0
NAUSEA: 0

## 2021-09-15 ASSESSMENT — PAIN DESCRIPTION - PAIN TYPE
TYPE: ACUTE PAIN

## 2021-09-15 ASSESSMENT — GAIT ASSESSMENTS
GAIT LEVEL OF ASSIST: SUPERVISED
ASSISTIVE DEVICE: FRONT WHEEL WALKER
DISTANCE (FEET): 150

## 2021-09-15 ASSESSMENT — ACTIVITIES OF DAILY LIVING (ADL): TOILETING: INDEPENDENT

## 2021-09-15 ASSESSMENT — FIBROSIS 4 INDEX: FIB4 SCORE: 5.99

## 2021-09-15 NOTE — H&P
"Hospital Medicine History & Physical Note    Date of Service  9/14/2021    Primary Care Physician  NAREN Vigil.    Consultants  None for now    Code Status  Full Code    Chief Complaint  Chief Complaint   Patient presents with   • Flank Pain     pt states he was here a few days ago for right flank pain and sent home, states since then the pain has got worse   • N/V     pt states hes been vomiting off and on for a couple days as well, states hes noticed some blood in it   • Blood in Vomit       History of Presenting Illness  Artem Weinstein is a 60 y.o. male with a past medical history of diabetes mellitus and alcohol abuse who presented 9/14/2021 with right flank/back pain, nausea/vomiting.    The patient states that he fell a couple of days ago and came to the ED at that time for evaluation.  Since then the patient has had severe right flank/back pain which the patient states he feels like he has a broken rib.  The patient says that the pain is more by moving, standing, and sometimes breathing.  The patient is also complaining of nausea/vomiting.  The patient is a heavy drinker.  The patient denies fever, chest pain, abdominal pain, headache or any other focal neurological deficit.    Initially there was a concern for blood in vomit, however the patient has not had an episode of bloody vomit while in the hospital.  We will continue to monitor for this and if it happens we will consult GI.     In the ER the patient had a white blood cell count of 11, platelet count of 127, sodium of 127, chloride of 85, CO2 of 19, glucose of 345, and a diagnostic alcohol level of 131.2.    The patient also had a CT of the chest abdomen and pelvis which reported: \"No acute inflammatory process in the chest, abdomen, or pelvis on this noncontrast CT. Punctate, nonobstructing bilateral renal stones. Smooth pleural thickening at the posterior right lower lobe measuring up to 1.6 cm.\"    There is a concern for " alcohol withdrawal, the patient will be admitted with telemetry for pain control, CIWA protocol and glucose control.      I discussed the plan of care with patient and ER physician.    Review of Systems  Review of Systems   Constitutional: Negative for chills and fever.   HENT: Negative for hearing loss and nosebleeds.    Eyes: Negative for blurred vision and double vision.   Respiratory: Negative for cough and shortness of breath.    Cardiovascular: Negative for chest pain and palpitations.   Gastrointestinal: Positive for nausea and vomiting. Negative for abdominal pain and heartburn.   Genitourinary: Negative for dysuria and urgency.   Musculoskeletal: Positive for falls.        Back/right flank pain   Skin: Negative for itching and rash.   Neurological: Negative for dizziness and headaches.   Psychiatric/Behavioral: Negative for substance abuse. The patient is not nervous/anxious.    All other systems reviewed and are negative.      Past Medical History   has a past medical history of Diabetes (HCC) and Renal disorder.    Surgical History   has no past surgical history on file.     Family History  Patient does not recall properly his family history.    Social History   reports that he has been smoking cigarettes. He has been smoking about 0.00 packs per day. He has never used smokeless tobacco. He reports current alcohol use. He reports that he does not use drugs.    Allergies  No Known Allergies    Medications  Prior to Admission Medications   Prescriptions Last Dose Informant Patient Reported? Taking?   Dulaglutide (TRULICITY) 0.75 MG/0.5ML Solution Pen-injector unknown at Unknown time  Yes Yes   Sig: Inject 0.5 mL under the skin every 7 days.   atorvastatin (LIPITOR) 40 MG Tab 4d ago at Unknown time  Yes Yes   Sig: Take 40 mg by mouth every evening.   lisinopril (PRINIVIL) 10 MG Tab 4d ago at Unknown time  Yes Yes   Sig: Take 10 mg by mouth every day.   metformin (GLUCOPHAGE) 1000 MG tablet 4d ago at Unknown  time  Yes Yes   Sig: Take 1,000 mg by mouth 2 times a day with meals.      Facility-Administered Medications: None       Physical Exam  Temp:  [36.2 °C (97.2 °F)-36.3 °C (97.3 °F)] 36.3 °C (97.3 °F)  Pulse:  [] 98  Resp:  [16-24] 19  BP: (117-158)/(72-98) 158/75  SpO2:  [91 %-98 %] 91 %  Blood Pressure: 158/75   Temperature: 36.3 °C (97.3 °F)   Pulse: 98   Respiration: 19   Pulse Oximetry: 91 %       Physical Exam  Vitals and nursing note reviewed.   Constitutional:       General: He is not in acute distress.     Appearance: Normal appearance. He is not toxic-appearing or diaphoretic.   HENT:      Head: Normocephalic and atraumatic.      Right Ear: External ear normal.      Left Ear: External ear normal.      Nose: Nose normal.      Mouth/Throat:      Mouth: Mucous membranes are moist.      Pharynx: Oropharynx is clear.   Eyes:      General:         Right eye: No discharge.         Left eye: No discharge.      Extraocular Movements: Extraocular movements intact.   Cardiovascular:      Rate and Rhythm: Regular rhythm. Tachycardia present.      Heart sounds: No murmur heard.     Pulmonary:      Effort: Pulmonary effort is normal. No respiratory distress.      Breath sounds: Normal breath sounds.   Abdominal:      General: Abdomen is flat. Bowel sounds are normal. There is no distension.      Palpations: Abdomen is soft.      Tenderness: There is no abdominal tenderness.   Musculoskeletal:         General: Tenderness (in the right flank/back lower ribs.) present.      Cervical back: Normal range of motion and neck supple.      Right lower leg: No edema.      Left lower leg: No edema.   Skin:     General: Skin is warm and dry.   Neurological:      General: No focal deficit present.      Mental Status: He is alert and oriented to person, place, and time.      Motor: No weakness.   Psychiatric:         Mood and Affect: Mood normal.         Behavior: Behavior normal.         Laboratory:  Recent Labs      09/14/21  0936   WBC 11.0*   RBC 5.79   HEMOGLOBIN 17.2   HEMATOCRIT 47.2   MCV 81.5   MCH 29.7   MCHC 36.4*   RDW 37.3   PLATELETCT 127*   MPV 10.0     Recent Labs     09/14/21  0936   SODIUM 127*   POTASSIUM 4.4   CHLORIDE 85*   CO2 19*   GLUCOSE 345*   BUN 17   CREATININE 0.75   CALCIUM 10.5     Recent Labs     09/14/21  0936   ALTSGPT 48   ASTSGOT 50*   ALKPHOSPHAT 139*   TBILIRUBIN 0.6   LIPASE 32   GLUCOSE 345*     Recent Labs     09/14/21  1220   APTT 28.3   INR 1.17*     No results for input(s): NTPROBNP in the last 72 hours.      Recent Labs     09/14/21  1220   TROPONINT 14       Imaging:  CT-CHEST,ABDOMEN,PELVIS W/O   Final Result      1.  No acute inflammatory process in the chest, abdomen, or pelvis on this noncontrast CT.   2.  Punctate, nonobstructing bilateral renal stones.   3.  Hepatic steatosis.   4.  Smooth pleural thickening at the posterior right lower lobe measuring up to 1.6 cm.   5.   Low and High Risk: Consider CT, PET/CT, or tissue sampling at 3 months      Low Risk - Minimal or absent history of smoking and of other known risk factors.      High Risk - History of smoking or of other known risk factors.      Note: These recommendations do not apply to lung cancer screening, patients with immunosuppression, or patients with known primary cancer.      Fleischner Society 2017 Guidelines for Management of Incidentally Detected Pulmonary Nodules in Adults             X-Ray:  I have personally reviewed the images and compared with prior images.  EKG:  I have personally reviewed the images and compared with prior images.    Assessment/Plan:  I anticipate this patient is appropriate for observation status at this time.    * Alcohol abuse  Assessment & Plan  Patient has a history of heavy alcohol abuse.  CIWA protocol has been started.  Vitamin supplementation has been started.  Telemetry monitoring for now.  There is a big concern for withdrawal    There was a concern for blood in vomit, however  "it is unclear if this actually happened. Hb is stable, and he has not had an episode at the hospital. We will monitor and if it changes we will consider consulting GI. He will require outpatient follow up.    Right flank pain  Assessment & Plan  There was an initial concern of rib fracture by the ER physician.  CT scan was ordered and did not report bone abnormality however reported: \"Smooth pleural thickening at the posterior right lower lobe measuring up to 1.6 cm.\"  Patient does have severe tenderness with mild palpation and breathing.  After discussing with the ER physician it could be possible that this was a result from his recent fall.  We will order pain management, monitor, make changes accordingly.    The CT scan did report: \"Punctate, nonobstructing bilateral renal stones.\"  However the clinical presentation does not correlate with kidney stone disease.  We will continue to monitor.    DM (diabetes mellitus) (HCC)  Assessment & Plan  Patient with a history of diabetes, not currently using insulin.  We will start the patient on insulin sliding scale for now.  Hypoglycemia protocol, monitor, make changes accordingly.  We have ordered an A1c.      Hyponatremia  Assessment & Plan  In the setting of alcohol abuse, nausea and vomiting.  We will start the patient on LR for now.  Monitor, repeat BMP, make changes accordingly.    GERD (gastroesophageal reflux disease)  Assessment & Plan  famotidine    Tobacco abuse  Assessment & Plan  Patient was counseled on lifestyle modifications.  The patient was counseled to stop smoking and was given options.  The patient would like to do nicotine patch while hospitalized which we have ordered.  The patient will require close follow-up with PCP as an outpatient.    Dyslipidemia  Assessment & Plan  Resume home dose of atorvastatin.    Electrolyte abnormality  Assessment & Plan  This is most likely due to alcohol abuse.  Patient with a sodium of 127, chloride of 85, CO2 of " 19.  We will start the patient on LR for now.  Monitor, make changes accordingly.    Leukocytosis  Assessment & Plan  Mild leukocytosis with a white blood cell count of 11.  The patient is afebrile, does not have hypotension.  There is no need to start antibiotics at this time.  Monitor, repeat CBC and make changes accordingly.    Hypertension  Assessment & Plan  We will resume the patient's home medication.  As needed labetalol.    Elevated LFTs  Assessment & Plan  This is most likely due to alcohol abuse.  The patient denying abdominal pain at this time.  Monitor, repeat CMP, make changes accordingly.    Thrombocytopenia (HCC)  Assessment & Plan  Platelet count of 127 on admission.  This could be due to his alcohol abuse.  We will repeat CBC tomorrow morning.  If platelet count continues to drop we will consider stopping heparin and order further workup.      VTE prophylaxis: heparin ppx

## 2021-09-15 NOTE — ASSESSMENT & PLAN NOTE
With concurrent hypochloremia, likely poor solute intake from EtOH use DO  Continue IVF  Repeat AM BMP

## 2021-09-15 NOTE — ASSESSMENT & PLAN NOTE
A1c 6.5  Trulicity and metformin held on admission  Low-dose glargine with SSI due to ketosis (though most likely EtOH rather than DKA)  Continue atorvastatin

## 2021-09-15 NOTE — ASSESSMENT & PLAN NOTE
Fell onto Right flank  CT C/A/P no acute bony abnormality  NSAIDs contraindicated and APAP limited due to JOHNSON  Low-dose oxycodone and flexeril PRN pain

## 2021-09-15 NOTE — ASSESSMENT & PLAN NOTE
Mild leukocytosis with a white blood cell count of 11.  The patient is afebrile, does not have hypotension.  There is no need to start antibiotics at this time.  Monitor, repeat CBC and make changes accordingly.

## 2021-09-15 NOTE — ASSESSMENT & PLAN NOTE
This is most likely due to alcohol abuse.  Patient with a sodium of 127, chloride of 85, CO2 of 19.  We will start the patient on LR for now.  Monitor, make changes accordingly.

## 2021-09-15 NOTE — THERAPY
"Occupational Therapy   Initial Evaluation     Patient Name: Artem Weinstein  Age:  60 y.o., Sex:  male  Medical Record #: [id number]  Today's Date: 9/15/2021          Assessment  Patient is 60 y.o. male admitted for right flank pain, nausea/vomitting, pt with recent history of right sided rib fractures. Pt lives in his truck trailer as he is on the road often as a , independent with all ADLs and mobility without an AD. Pt able to complete all mobility and ADLs with supervision and use of a FWW to offset back/rib pain, pt refused FWW for home, seems to be at his functional baseline will complete order at this time. Patient will not be actively followed for occupational therapy services at this time, however may be seen if requested by physician for 1 more visit within 30 days to address any discharge or equipment needs.     Plan    Recommend Occupational Therapy for Evaluation only.    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)   Steps Into Home 3   Steps In Home 0   Bathroom Set up Walk In Shower;Shower Chair   Equipment Owned Tub / Shower Seat   Lives with - Patient's Self Care Capacity Alone and Able to Care For Self   Prior Level of ADL Function   Self Feeding Independent   Grooming / Hygiene Independent   Bathing Independent   Dressing Independent   Toileting Independent   Prior Level of IADL Function   Medication Management Independent   Laundry Independent   Kitchen Mobility Independent   Finances Independent   Home Management Independent   Shopping Independent   Prior Level Of Mobility Independent Without " Device in Community   Driving / Transportation Driving Independent   Occupation (Pre-Hospital Vocational) Employed Full Time  ()   Pain 0 - 10 Group   Therapist Pain Assessment During Activity  (moving bed, flank pain)   Cognition    Cognition / Consciousness WDL   Comments Cooperative, receptive to therapy   Active ROM Upper Body   Active ROM Upper Body  WDL   Dominant Hand Right   Strength Upper Body   Upper Body Strength  WDL   Sensation Upper Body   Upper Extremity Sensation  WDL   Upper Body Muscle Tone   Upper Body Muscle Tone  WDL   Neurological Concerns   Neurological Concerns No   Coordination Upper Body   Coordination WDL   Balance Assessment   Sitting Balance (Static) Fair   Sitting Balance (Dynamic) Fair   Standing Balance (Static) Fair   Standing Balance (Dynamic) Fair   Weight Shift Sitting Fair   Weight Shift Standing Fair   Comments w/ FWW   Bed Mobility    Supine to Sit Supervised   Sit to Supine Supervised   Scooting Supervised   Comments HOB elevated, plans to sleep in chair   ADL Assessment   Upper Body Dressing Supervision   Lower Body Dressing Supervision   Toileting   (NT-refused need, no assist with urinal per patient)   How much help from another person does the patient currently need...   Putting on and taking off regular lower body clothing? 4   Bathing (including washing, rinsing, and drying)? 4   Toileting, which includes using a toilet, bedpan, or urinal? 4   Putting on and taking off regular upper body clothing? 4   Taking care of personal grooming such as brushing teeth? 4   Eating meals? 4   6 Clicks Daily Activity Score 24   Functional Mobility   Sit to Stand Supervised   Bed, Chair, Wheelchair Transfer Supervised   Toilet Transfers Refused   Transfer Method Stand Step   Mobility bed mobility, hallway mobility, back to bed   Comments w/ FWW   ICU Target Mobility Level   ICU Mobility - Targeted Level Level 4   Visual Perception   Visual Perception  Not Applicable   Edema  / Skin Assessment   Edema / Skin  Not Assessed   Activity Tolerance   Sitting Edge of Bed 5 min   Standing 10 min   Education Group   Education Provided Role of Occupational Therapist   Role of Occupational Therapist Patient Response Patient;Acceptance;Explanation   Problem List   Problem List None   Interdisciplinary Plan of Care Collaboration   IDT Collaboration with  Nursing;Physical Therapist   Patient Position at End of Therapy In Bed;Call Light within Reach;Tray Table within Reach;Phone within Reach;Other (Comments)  (on NAEEM nassar)   Collaboration Comments RN updated

## 2021-09-15 NOTE — THERAPY
Physical Therapy   Initial Evaluation     Patient Name: Artem Weinstein  Age:  60 y.o., Sex:  male  Medical Record #: [id number]  Today's Date: 9/15/2021     Precautions  Precautions: Fall Risk    Assessment  Patient is 60 y.o. male returning to this hospital with R flank pain/ bloody emesis after previous stay last week fallowing GLF from acute intoxication. Pt found supine in bed, receptive to therapy. Complaints of significant pain transferring to EOB, however no assist needed. Pt requested use of FWW though normally does not use one at home, PT obliged. With FWW pt ambulated hallway with no gait deficits noted. Pt was returned to room, PT noted pt ditched the FWW before returning to bed and displayed some staggered steps to the EOB. Able to transfer self back to supine with no assist. Pt overall has no functional deficits other than pain, pt is unsteady with no AD but steady with FWW. Therefore recommend DCPT with FWW to use at home until pain resolves.       Plan    Recommend Physical Therapy for Evaluation only    DC Equipment Recommendations: Front-Wheel Walker  Discharge Recommendations: Anticipate that the patient will have no further physical therapy needs after discharge from the hospital     Objective       09/15/21 1056   Pain 0 - 10 Group   Therapist Pain Assessment Prior to Activity;0;During Activity;6;Nurse Notified  (per pt R rib pain from previous fall)   Prior Living Situation   Prior Services Home-Independent   Housing / Facility 1 Story House   Steps Into Home 3   Steps In Home 0   Lives with - Patient's Self Care Capacity Alone and Able to Care For Self   Prior Level of Functional Mobility   Bed Mobility Independent   Transfer Status Independent   Ambulation Independent   Distance Ambulation (Feet) 250   Assistive Devices Used None   Stairs Independent   History of Falls   History of Falls Yes   Date of Last Fall   (last admit )   Cognition    Cognition / Consciousness WDL   Passive ROM  Lower Body   Passive ROM Lower Body WDL   Active ROM Lower Body    Active ROM Lower Body  WDL   Strength Lower Body   Lower Body Strength  WDL   Sensation Lower Body   Lower Extremity Sensation   WDL   Lower Body Muscle Tone   Lower Body Muscle Tone  WDL   Coordination Lower Body    Coordination Lower Body  WDL   Balance Assessment   Sitting Balance (Static) Fair   Sitting Balance (Dynamic) Fair   Standing Balance (Static) Fair   Standing Balance (Dynamic) Fair   Weight Shift Sitting Fair   Weight Shift Standing Fair   Comments with FWW    Gait Analysis   Gait Level Of Assist Supervised   Assistive Device Front Wheel Walker   Distance (Feet) 150   # of Times Distance was Traveled 1   Comments pt showed functional capability to perform stairs    Bed Mobility    Supine to Sit Supervised   Sit to Supine Supervised   Scooting Supervised   Functional Mobility   Sit to Stand Supervised   Bed, Chair, Wheelchair Transfer Supervised   Transfer Method Stand Step   ICU Target Mobility Level   ICU Mobility - Targeted Level Level 4   Activity Tolerance   Sitting Edge of Bed 5   Standing 10

## 2021-09-15 NOTE — ASSESSMENT & PLAN NOTE
Reports binges of beer or liquor  Presents with ketosis, ataxia, alcohol hepatitis, and falls  Counseled on cessation due to negative effects on current health  Offer MAT and AA referral prior to discharge  Monitor for withdrawal with CIWA protocol  Empiric detox bag, MVN, thiamine, folate

## 2021-09-15 NOTE — ASSESSMENT & PLAN NOTE
Mild, resolved  Has underlying JOHNSON based on CT A/P  Encourage complete cessation with MAT & AA  T2DM well-controlled  No indication to repeat LFTs

## 2021-09-16 VITALS
HEART RATE: 95 BPM | OXYGEN SATURATION: 94 % | WEIGHT: 171.08 LBS | DIASTOLIC BLOOD PRESSURE: 74 MMHG | BODY MASS INDEX: 26.85 KG/M2 | SYSTOLIC BLOOD PRESSURE: 131 MMHG | HEIGHT: 67 IN | RESPIRATION RATE: 18 BRPM | TEMPERATURE: 97.4 F

## 2021-09-16 LAB
ANION GAP SERPL CALC-SCNC: 11 MMOL/L (ref 7–16)
BUN SERPL-MCNC: 12 MG/DL (ref 8–22)
CALCIUM SERPL-MCNC: 8.1 MG/DL (ref 8.5–10.5)
CHLORIDE SERPL-SCNC: 95 MMOL/L (ref 96–112)
CO2 SERPL-SCNC: 27 MMOL/L (ref 20–33)
CREAT SERPL-MCNC: 0.55 MG/DL (ref 0.5–1.4)
ERYTHROCYTE [DISTWIDTH] IN BLOOD BY AUTOMATED COUNT: 38.4 FL (ref 35.9–50)
GLUCOSE BLD-MCNC: 207 MG/DL (ref 65–99)
GLUCOSE BLD-MCNC: 222 MG/DL (ref 65–99)
GLUCOSE SERPL-MCNC: 164 MG/DL (ref 65–99)
HCT VFR BLD AUTO: 37.9 % (ref 42–52)
HGB BLD-MCNC: 13.4 G/DL (ref 14–18)
MAGNESIUM SERPL-MCNC: 1.6 MG/DL (ref 1.5–2.5)
MCH RBC QN AUTO: 29.9 PG (ref 27–33)
MCHC RBC AUTO-ENTMCNC: 35.4 G/DL (ref 33.7–35.3)
MCV RBC AUTO: 84.6 FL (ref 81.4–97.8)
MORPHOLOGY BLD-IMP: NORMAL
PHOSPHATE SERPL-MCNC: 2 MG/DL (ref 2.5–4.5)
PLATELET # BLD AUTO: 53 K/UL (ref 164–446)
PLATELETS.RETICULATED NFR BLD AUTO: 4.9 K/UL (ref 0.6–13.1)
PMV BLD AUTO: 10.1 FL (ref 9–12.9)
POTASSIUM SERPL-SCNC: 3 MMOL/L (ref 3.6–5.5)
RBC # BLD AUTO: 4.48 M/UL (ref 4.7–6.1)
SODIUM SERPL-SCNC: 133 MMOL/L (ref 135–145)
WBC # BLD AUTO: 5.1 K/UL (ref 4.8–10.8)

## 2021-09-16 PROCEDURE — 700111 HCHG RX REV CODE 636 W/ 250 OVERRIDE (IP): Performed by: STUDENT IN AN ORGANIZED HEALTH CARE EDUCATION/TRAINING PROGRAM

## 2021-09-16 PROCEDURE — 36415 COLL VENOUS BLD VENIPUNCTURE: CPT

## 2021-09-16 PROCEDURE — 84100 ASSAY OF PHOSPHORUS: CPT

## 2021-09-16 PROCEDURE — 99217 PR OBSERVATION CARE DISCHARGE: CPT | Performed by: STUDENT IN AN ORGANIZED HEALTH CARE EDUCATION/TRAINING PROGRAM

## 2021-09-16 PROCEDURE — G0378 HOSPITAL OBSERVATION PER HR: HCPCS

## 2021-09-16 PROCEDURE — 85027 COMPLETE CBC AUTOMATED: CPT

## 2021-09-16 PROCEDURE — 82962 GLUCOSE BLOOD TEST: CPT

## 2021-09-16 PROCEDURE — 83735 ASSAY OF MAGNESIUM: CPT

## 2021-09-16 PROCEDURE — 96372 THER/PROPH/DIAG INJ SC/IM: CPT

## 2021-09-16 PROCEDURE — 700111 HCHG RX REV CODE 636 W/ 250 OVERRIDE (IP): Performed by: INTERNAL MEDICINE

## 2021-09-16 PROCEDURE — 700105 HCHG RX REV CODE 258: Performed by: INTERNAL MEDICINE

## 2021-09-16 PROCEDURE — 80048 BASIC METABOLIC PNL TOTAL CA: CPT

## 2021-09-16 PROCEDURE — 96367 TX/PROPH/DG ADDL SEQ IV INF: CPT

## 2021-09-16 PROCEDURE — A9270 NON-COVERED ITEM OR SERVICE: HCPCS | Performed by: INTERNAL MEDICINE

## 2021-09-16 PROCEDURE — 700102 HCHG RX REV CODE 250 W/ 637 OVERRIDE(OP): Performed by: INTERNAL MEDICINE

## 2021-09-16 PROCEDURE — 700102 HCHG RX REV CODE 250 W/ 637 OVERRIDE(OP): Performed by: STUDENT IN AN ORGANIZED HEALTH CARE EDUCATION/TRAINING PROGRAM

## 2021-09-16 PROCEDURE — A9270 NON-COVERED ITEM OR SERVICE: HCPCS | Performed by: STUDENT IN AN ORGANIZED HEALTH CARE EDUCATION/TRAINING PROGRAM

## 2021-09-16 PROCEDURE — 96376 TX/PRO/DX INJ SAME DRUG ADON: CPT

## 2021-09-16 PROCEDURE — 96366 THER/PROPH/DIAG IV INF ADDON: CPT

## 2021-09-16 PROCEDURE — 85055 RETICULATED PLATELET ASSAY: CPT

## 2021-09-16 RX ORDER — MAGNESIUM SULFATE HEPTAHYDRATE 40 MG/ML
2 INJECTION, SOLUTION INTRAVENOUS ONCE
Status: COMPLETED | OUTPATIENT
Start: 2021-09-16 | End: 2021-09-16

## 2021-09-16 RX ORDER — FAMOTIDINE 20 MG/1
20 TABLET, FILM COATED ORAL 2 TIMES DAILY
Qty: 60 TABLET | Refills: 0 | Status: SHIPPED | OUTPATIENT
Start: 2021-09-16

## 2021-09-16 RX ORDER — IBUPROFEN 800 MG/1
800 TABLET ORAL EVERY 8 HOURS PRN
Qty: 15 TABLET | Refills: 0 | Status: SHIPPED | OUTPATIENT
Start: 2021-09-16 | End: 2021-09-21

## 2021-09-16 RX ORDER — POTASSIUM CHLORIDE 20 MEQ/1
60 TABLET, EXTENDED RELEASE ORAL DAILY
Status: DISCONTINUED | OUTPATIENT
Start: 2021-09-16 | End: 2021-09-16 | Stop reason: HOSPADM

## 2021-09-16 RX ORDER — CYCLOBENZAPRINE HCL 10 MG
10 TABLET ORAL 3 TIMES DAILY PRN
Qty: 30 TABLET | Refills: 0 | Status: SHIPPED | OUTPATIENT
Start: 2021-09-16

## 2021-09-16 RX ADMIN — SODIUM CHLORIDE, POTASSIUM CHLORIDE, SODIUM LACTATE AND CALCIUM CHLORIDE: 600; 310; 30; 20 INJECTION, SOLUTION INTRAVENOUS at 01:43

## 2021-09-16 RX ADMIN — HYDROMORPHONE HYDROCHLORIDE 0.25 MG: 1 INJECTION, SOLUTION INTRAMUSCULAR; INTRAVENOUS; SUBCUTANEOUS at 09:02

## 2021-09-16 RX ADMIN — INSULIN HUMAN 3 UNITS: 100 INJECTION, SOLUTION PARENTERAL at 08:48

## 2021-09-16 RX ADMIN — Medication 100 MG: at 06:05

## 2021-09-16 RX ADMIN — LISINOPRIL 10 MG: 10 TABLET ORAL at 06:05

## 2021-09-16 RX ADMIN — FAMOTIDINE 20 MG: 20 TABLET ORAL at 06:04

## 2021-09-16 RX ADMIN — THERA TABS 1 TABLET: TAB at 06:05

## 2021-09-16 RX ADMIN — OXYCODONE 5 MG: 5 TABLET ORAL at 04:37

## 2021-09-16 RX ADMIN — MAGNESIUM SULFATE IN WATER 2 G: 40 INJECTION, SOLUTION INTRAVENOUS at 08:47

## 2021-09-16 RX ADMIN — NICOTINE 14 MG: 14 PATCH TRANSDERMAL at 06:04

## 2021-09-16 RX ADMIN — FOLIC ACID 1 MG: 1 TABLET ORAL at 06:05

## 2021-09-16 RX ADMIN — POTASSIUM CHLORIDE 60 MEQ: 1500 TABLET, EXTENDED RELEASE ORAL at 08:47

## 2021-09-16 ASSESSMENT — PAIN DESCRIPTION - PAIN TYPE
TYPE: ACUTE PAIN
TYPE: ACUTE PAIN

## 2021-09-16 NOTE — PROGRESS NOTES
"Hospital Medicine Daily Progress Note    Date of Service  9/15/2021    Chief Complaint  Artem Weinstein is a 60 y.o. male with T2DM and EtOH use DO admitted 9/14/2021 with rib pain after fall.    Hospital Course  No notes on file    Interval Problem Update  He reports only drinking during his off months from .  He'll drink a 6-pack of beer or 1/2 pint of liquor daily.  I advised him this is excessive, as the healthy limit for a man is typically 2 daily, no more than 4 in one day.  He recognizes that he's had issues with alcohol in the past and that it contributes to his imbalance.  He does OK with a walker.  He's had \"mini-falls\" though attributes his rib pain to tripping over a plantar box.  He denies h/o EtOH withdrawal, seizures.  He has severe Right rib pain that comes in spasms. It is improving with oxycodone.  He denies N/V, F/C, bleeding, presyncope, syncope.    I have personally seen and examined the patient at bedside. I discussed the plan of care with patient and bedside RN.    Consultants/Specialty  None    Code Status  Full Code    Disposition  Patient is medically cleared pending resolution of AGMA and 24h withdrawal monitoring.   Anticipate discharge to to home with close outpatient follow-up.  I have placed the appropriate orders for post-discharge needs.    Review of Systems  Review of Systems   Constitutional: Negative for chills, fever and malaise/fatigue.   HENT: Negative for ear pain, nosebleeds, sinus pain and sore throat.    Eyes: Negative for pain.   Respiratory: Negative for hemoptysis and shortness of breath.    Cardiovascular: Positive for chest pain. Negative for leg swelling.   Gastrointestinal: Negative for abdominal pain, blood in stool, constipation, diarrhea, melena, nausea and vomiting.   Genitourinary: Negative for dysuria, flank pain and hematuria.   Musculoskeletal: Positive for falls and myalgias. Negative for back pain, joint pain and neck pain. "   Neurological: Positive for tremors and weakness. Negative for dizziness, seizures, loss of consciousness and headaches.   Endo/Heme/Allergies: Does not bruise/bleed easily.   Psychiatric/Behavioral: Positive for substance abuse. Negative for depression. The patient is not nervous/anxious.    All other systems reviewed and are negative.       Physical Exam  Temp:  [36.6 °C (97.9 °F)-37.2 °C (98.9 °F)] 37.2 °C (98.9 °F)  Pulse:  [] 101  Resp:  [12-23] 20  BP: (119-137)/(69-84) 119/72  SpO2:  [85 %-94 %] 92 %    Physical Exam  Vitals and nursing note reviewed.   Constitutional:       General: He is not in acute distress.     Appearance: He is not ill-appearing, toxic-appearing or diaphoretic.   HENT:      Head: Normocephalic.      Nose: Nose normal.      Mouth/Throat:      Mouth: Mucous membranes are moist.      Pharynx: Oropharynx is clear.      Comments: No tongue fasciculations  Eyes:      General: No scleral icterus.     Conjunctiva/sclera: Conjunctivae normal.   Cardiovascular:      Rate and Rhythm: Normal rate and regular rhythm.      Pulses: Normal pulses.      Heart sounds: Normal heart sounds. No murmur heard.   No friction rub. No gallop.    Pulmonary:      Effort: Pulmonary effort is normal. No respiratory distress.      Breath sounds: No wheezing, rhonchi or rales.   Chest:      Chest wall: Tenderness (Mild at Right costal posterior costal margin) present.   Abdominal:      General: Abdomen is flat. Bowel sounds are normal. There is no distension.      Palpations: Abdomen is soft.      Tenderness: There is no abdominal tenderness. There is no guarding or rebound.   Genitourinary:     Comments: No samson. Urine hetal / clear.  Musculoskeletal:      Cervical back: Neck supple.      Right lower leg: Edema present.      Left lower leg: Edema present.      Comments: 1+ pitting to knees   Skin:     General: Skin is warm and dry.   Neurological:      Mental Status: He is alert.      Motor: Tremor  (High-frequency low-amplitude resting tremor) present. No weakness (5/5 BLE).      Coordination: Romberg sign negative.      Comments: Appropriately conversant   Psychiatric:         Mood and Affect: Mood normal.         Behavior: Behavior normal.         Thought Content: Thought content normal.         Judgment: Judgment normal.         Fluids    Intake/Output Summary (Last 24 hours) at 9/15/2021 2107  Last data filed at 9/15/2021 1800  Gross per 24 hour   Intake 2325.33 ml   Output 950 ml   Net 1375.33 ml       Laboratory  Recent Labs     09/14/21  0936 09/15/21  0200   WBC 11.0* 5.1   RBC 5.79 4.46*   HEMOGLOBIN 17.2 13.2*   HEMATOCRIT 47.2 37.2*   MCV 81.5 83.4   MCH 29.7 29.6   MCHC 36.4* 35.5*   RDW 37.3 39.0   PLATELETCT 127* 63*   MPV 10.0 10.0     Recent Labs     09/14/21  0936 09/15/21  0200   SODIUM 127* 131*   POTASSIUM 4.4 3.3*   CHLORIDE 85* 94*   CO2 19* 25   GLUCOSE 345* 280*   BUN 17 18   CREATININE 0.75 0.56   CALCIUM 10.5 7.8*     Recent Labs     09/14/21  1220   APTT 28.3   INR 1.17*               Imaging  CT-CHEST,ABDOMEN,PELVIS W/O   Final Result      1.  No acute inflammatory process in the chest, abdomen, or pelvis on this noncontrast CT.   2.  Punctate, nonobstructing bilateral renal stones.   3.  Hepatic steatosis.   4.  Smooth pleural thickening at the posterior right lower lobe measuring up to 1.6 cm.   5.   Low and High Risk: Consider CT, PET/CT, or tissue sampling at 3 months      Low Risk - Minimal or absent history of smoking and of other known risk factors.      High Risk - History of smoking or of other known risk factors.      Note: These recommendations do not apply to lung cancer screening, patients with immunosuppression, or patients with known primary cancer.      Fleischner Society 2017 Guidelines for Management of Incidentally Detected Pulmonary Nodules in Adults              Assessment/Plan  * Alcohol use disorder, moderate, dependence (HCC)- (present on  "admission)  Assessment & Plan  Reports binges of beer or liquor  Presents with ketosis, ataxia, alcohol hepatitis, and falls  Counseled on cessation due to negative effects on current health  Offer MAT and AA referral prior to discharge  Monitor for withdrawal with CIWA protocol  Empiric detox bag, MVN, thiamine, folate    Multiple falls- (present on admission)  Assessment & Plan  Reports multiple \"mini-falls\"  Due to EtOH use DO, encourage complete cessation  PT/OT evaluated in ED, recommended 4WW without post-acute needs  Empiric thiamine    Contusion of rib on right side- (present on admission)  Assessment & Plan  Fell onto Right flank  CT C/A/P no acute bony abnormality  NSAIDs contraindicated and APAP limited due to JOHNSON  Low-dose oxycodone and flexeril PRN pain    Hypophosphatemia- (present on admission)  Assessment & Plan  Likely due to EtOH use DO  Repeat AM phos, if not improving start Kphos    High anion gap metabolic acidosis- (present on admission)  Assessment & Plan  Lactate not obtained prior to starting IVF  BHB elevated, likely EtOH ketosis rather than DKA  VBG pH 7.4  Continue LR  Repeat AM BMP  Repeat lactate if AGMA persists    Alcoholic steatohepatitis- (present on admission)  Assessment & Plan  Mild, resolved  Has underlying JOHNSON based on CT A/P  Encourage complete cessation with MAT & AA  T2DM well-controlled  No indication to repeat LFTs    Type 2 diabetes mellitus without complication, without long-term current use of insulin (HCC)- (present on admission)  Assessment & Plan  A1c 6.5  Trulicity and metformin held on admission  Low-dose glargine with SSI due to ketosis (though most likely EtOH rather than DKA)  Continue atorvastatin      Hyponatremia- (present on admission)  Assessment & Plan  With concurrent hypochloremia, likely poor solute intake from EtOH use DO  Continue IVF  Repeat AM BMP    Thrombocytopenia (HCC)- (present on admission)  Assessment & Plan  Likely due to alcohol " hepatitis  Repeat AM CBC  Hold LMWH for platelets <50    GERD (gastroesophageal reflux disease)- (present on admission)  Assessment & Plan  Continue famotidine    Tobacco abuse- (present on admission)  Assessment & Plan  NRT patch and lozenges    Dyslipidemia- (present on admission)  Assessment & Plan  Continue atorvastatin    Essential hypertension- (present on admission)  Assessment & Plan  Continue lisinopril  No indication for PRN antihypertensive       VTE prophylaxis: enoxaparin ppx    I have performed a physical exam and reviewed and updated ROS and Plan today (9/15/2021). In review of yesterday's note (9/14/2021), there are no changes except as documented above.

## 2021-09-16 NOTE — DISCHARGE SUMMARY
"Discharge Summary    CHIEF COMPLAINT ON ADMISSION  Chief Complaint   Patient presents with   • Flank Pain     pt states he was here a few days ago for right flank pain and sent home, states since then the pain has got worse   • N/V     pt states hes been vomiting off and on for a couple days as well, states hes noticed some blood in it   • Blood in Vomit       Reason for Admission  rt flank pain;vomiting     Admission Date  9/14/2021    CODE STATUS  Full Code    HPI & HOSPITAL COURSE  This is a 60 y.o. male here with right flank pain  The patient states that he fell a couple of days ago and came to the ED at that time for evaluation.  Since then the patient has had severe right flank/back pain which the patient states he feels like he has a broken rib.  The patient says that the pain is more by moving, standing, and sometimes breathing.  The patient is also complaining of nausea/vomiting.  The patient is a heavy drinker.  The patient denies fever, chest pain, abdominal pain, headache or any other focal neurological deficit.     Initially there was a concern for blood in vomit, however the patient has not had an episode of bloody vomit while in the hospital.  We will continue to monitor for this and if it happens we will consult GI.      In the ER the patient had a white blood cell count of 11, platelet count of 127, sodium of 127, chloride of 85, CO2 of 19, glucose of 345, and a diagnostic alcohol level of 131.2.     The patient also had a CT of the chest abdomen and pelvis which reported: \"No acute inflammatory process in the chest, abdomen, or pelvis on this noncontrast CT. Punctate, nonobstructing bilateral renal stones. Smooth pleural thickening at the posterior right lower lobe measuring up to 1.6 cm.\"    Patient was treated during hospitalization under CIWA protocol patient CIWA score however on day of discharge was only 1.  Patient was provided with electrolyte replete meant patient was provided with diabetic " education.  Patient reassured our medical team that he has all in this necessary medications at home.  However based on his blood glucose there is question of compliance.  Patient became adamant about leaving.  Patient was provided with ibuprofen 800 mg to be taken every 8 hours for no more than 5 days upon discharge.  Patient was ambulatory without vomiting never had episodes of hematemesis and had hemoglobin of 13.4 on day of discharge.    Patient will follow up with primary care provider as scheduled below for further evaluation of hospital results and medical management.  Therefore, he is discharged in good and stable condition to home with close outpatient follow-up.    The patient recovered much more quickly than anticipated on admission.    Discharge Date  9/16/2021    FOLLOW UP ITEMS POST DISCHARGE  Follow-up primary care provider    DISCHARGE DIAGNOSES  Principal Problem:    Alcohol use disorder, moderate, dependence (HCC) POA: Yes  Active Problems:    Contusion of rib on right side POA: Yes    Thrombocytopenia (HCC) POA: Yes    Hyponatremia POA: Yes    Type 2 diabetes mellitus without complication, without long-term current use of insulin (HCC) POA: Yes    Alcoholic steatohepatitis POA: Yes    Essential hypertension POA: Yes    Dyslipidemia POA: Yes    Tobacco abuse POA: Yes    Gastroesophageal reflux disease without esophagitis POA: Yes    High anion gap metabolic acidosis POA: Yes    Hypophosphatemia POA: Yes    Multiple falls POA: Yes  Resolved Problems:    * No resolved hospital problems. *      FOLLOW UP  No future appointments.  Myra Carrington A.P.R.N.  [address] [facility] [address]  [phone]    Schedule an appointment as soon as possible for a visit in 2 weeks  Hospital discharge      MEDICATIONS ON DISCHARGE     Medication List      START taking these medications      Instructions   cyclobenzaprine 10 mg Tabs  Commonly known as: Flexeril   Take 1 Tablet by mouth 3 times a day as needed  for Muscle Spasms.  Dose: 10 mg     famotidine 20 MG Tabs  Commonly known as: PEPCID   Take 1 Tablet by mouth 2 times a day.  Dose: 20 mg     ibuprofen 800 MG Tabs  Commonly known as: MOTRIN   Take 1 Tablet by mouth every 8 hours as needed for Moderate Pain for up to 5 days.  Dose: 800 mg        CONTINUE taking these medications      Instructions   Lipitor 40 MG Tabs  Generic drug: atorvastatin   Take 40 mg by mouth every evening.  Dose: 40 mg     lisinopril 10 MG Tabs  Commonly known as: PRINIVIL   Take 10 mg by mouth every day.  Dose: 10 mg     metformin 1000 MG tablet  Commonly known as: GLUCOPHAGE   Take 1,000 mg by mouth 2 times a day with meals.  Dose: 1,000 mg     Trulicity 0.75 MG/0.5ML Sopn  Generic drug: Dulaglutide   Inject 0.5 mL under the skin every 7 days.  Dose: 0.5 mL            Allergies  Allergies   Allergen Reactions   • Shellfish Allergy Anaphylaxis       DIET  Orders Placed This Encounter   Procedures   • Diet Order Diet: Consistent CHO (Diabetic)     Standing Status:   Standing     Number of Occurrences:   1     Order Specific Question:   Diet:     Answer:   Consistent CHO (Diabetic) [4]       ACTIVITY  As tolerated.  Weight bearing as tolerated    CONSULTATIONS  None    PROCEDURES  None    LABORATORY  Lab Results   Component Value Date    SODIUM 133 (L) 09/16/2021    POTASSIUM 3.0 (L) 09/16/2021    CHLORIDE 95 (L) 09/16/2021    CO2 27 09/16/2021    GLUCOSE 164 (H) 09/16/2021    BUN 12 09/16/2021    CREATININE 0.55 09/16/2021        Lab Results   Component Value Date    WBC 5.1 09/16/2021    HEMOGLOBIN 13.4 (L) 09/16/2021    HEMATOCRIT 37.9 (L) 09/16/2021    PLATELETCT 53 (L) 09/16/2021        Total time of the discharge process exceeds 35 minutes.

## 2021-09-16 NOTE — PROGRESS NOTES
Received report and assumed care of patient. Patient is alert and oriented x4. Patient is in no signs of distress, pain to right rib area. Patient able to ambulate x1 from gurney to bed. Patient was updated on the plan of care for the day. Call light within reach, bed in low position, 2 side rails up. All fall precautions in place. Will continue to monitor.    COVID-19 surge in effect.

## 2021-09-16 NOTE — ASSESSMENT & PLAN NOTE
"Reports multiple \"mini-falls\"  Due to EtOH use DO, encourage complete cessation  PT/OT evaluated in ED, recommended 4WW without post-acute needs  Empiric thiamine  "

## 2021-09-16 NOTE — ASSESSMENT & PLAN NOTE
Lactate not obtained prior to starting IVF  BHB elevated, likely EtOH ketosis rather than DKA  VBG pH 7.4  Continue LR  Repeat AM BMP  Repeat lactate if AGMA persists

## 2021-09-16 NOTE — DISCHARGE INSTRUCTIONS
Discharge Instructions    Discharged to home by car with self. Discharged via walking, hospital escort: Yes.  Special equipment needed: Not Applicable    Be sure to schedule a follow-up appointment with your primary care doctor or any specialists as instructed.     Discharge Plan:   Diet Plan: Discussed  Activity Level: Discussed  Confirmed Follow up Appointment: Appointment Scheduled  Confirmed Symptoms Management: Discussed  Medication Reconciliation Updated: Yes    I understand that a diet low in cholesterol, fat, and sodium is recommended for good health. Unless I have been given specific instructions below for another diet, I accept this instruction as my diet prescription.   Other diet: diabetic    Special Instructions: None    · Is patient discharged on Warfarin / Coumadin?   No     Depression / Suicide Risk    As you are discharged from this [facility] facility, it is important to learn how to keep safe from harming yourself.    Recognize the warning signs:  · Abrupt changes in personality, positive or negative- including increase in energy   · Giving away possessions  · Change in eating patterns- significant weight changes-  positive or negative  · Change in sleeping patterns- unable to sleep or sleeping all the time   · Unwillingness or inability to communicate  · Depression  · Unusual sadness, discouragement and loneliness  · Talk of wanting to die  · Neglect of personal appearance   · Rebelliousness- reckless behavior  · Withdrawal from people/activities they love  · Confusion- inability to concentrate     If you or a loved one observes any of these behaviors or has concerns about self-harm, here's what you can do:  · Talk about it- your feelings and reasons for harming yourself  · Remove any means that you might use to hurt yourself (examples: pills, rope, extension cords, firearm)  · Get professional help from the community (Mental Health, Substance Abuse, psychological counseling)  · Do not be  alone:Call your Safe Contact- someone whom you trust who will be there for you.  · Call your local CRISIS HOTLINE 304-9469 or 551-289-3980  · Call your local Children's Mobile Crisis Response Team [place] (281) 924-6644 or www.[employer]  · Call the toll free National Suicide Prevention Hotlines   · National Suicide Prevention Lifeline 948-471-BWGV (1189)  · [employer] 800-SUICIDE (323-1172)      Rib Contusion  A rib contusion is a deep bruise on your rib area. Contusions are the result of a blunt trauma that causes bleeding and injury to the tissues under the skin. A rib contusion may involve bruising of the ribs and of the skin and muscles in the area. The skin over the contusion may turn blue, purple, or yellow. Minor injuries will give you a painless contusion. More severe contusions may stay painful and swollen for a few weeks.  What are the causes?  This condition is usually caused by a blow, trauma, or direct force to an area of the body. This often occurs while playing contact sports.  What are the signs or symptoms?  Symptoms of this condition include:  · Swelling and redness of the injured area.  · Discoloration of the injured area.  · Tenderness and soreness of the injured area.  · Pain with or without movement.  How is this diagnosed?  This condition may be diagnosed based on:  · Your symptoms and medical history.  · A physical exam.  · Imaging tests--such as an X-ray, CT scan, or MRI--to determine if there were internal injuries or broken bones (fractures).  How is this treated?  This condition may be treated with:  · Rest. This is often the best treatment for a rib contusion.  · Icing. This reduces swelling and inflammation.  · Deep-breathing exercises. These may be recommended to reduce the risk for lung collapse and pneumonia.  · Medicines. Over-the-counter or prescription medicines may be given to control pain.  · Injection of a numbing medicine around the nerve near your  injury (nerve block).  Follow these instructions at home:         Medicines  · Take over-the-counter and prescription medicines only as told by your health care provider.  · Do not drive or use heavy machinery while taking prescription pain medicine.  · If you are taking prescription pain medicine, take actions to prevent or treat constipation. Your health care provider may recommend that you:  ? Drink enough fluid to keep your urine pale yellow.  ? Eat foods that are high in fiber, such as fresh fruits and vegetables, whole grains, and beans.  ? Limit foods that are high in fat and processed sugars, such as fried or sweet foods.  ? Take an over-the-counter or prescription medicine for constipation.  Managing pain, stiffness, and swelling  · If directed, put ice on the injured area:  ? Put ice in a plastic bag.  ? Place a towel between your skin and the bag.  ? Leave the ice on for 20 minutes, 2-3 times a day.  · Rest the injured area. Avoid strenuous activity and any activities or movements that cause pain. Be careful during activities and avoid bumping the injured area.  · Do not lift anything that is heavier than 5 lb (2.3 kg), or the limit that you are told, until your health care provider says that it is safe.  General instructions  · Do not use any products that contain nicotine or tobacco, such as cigarettes and e-cigarettes. These can delay healing. If you need help quitting, ask your health care provider.  · Do deep-breathing exercises as told by your health care provider.  · If you were given an incentive spirometer, use it every 1-2 hours while you are awake, or as recommended by your health care provider. This device measures how well you are filling your lungs with each breath.  · Keep all follow-up visits as told by your health care provider. This is important.  Contact a health care provider if you have:  · Increased bruising or swelling.  · Pain that is not controlled with treatment.  · A fever.  Get  help right away if you:  · Have difficulty breathing or shortness of breath.  · Develop a continual cough or you cough up thick or bloody sputum.  · Feel nauseous or you vomit.  · Have pain in your abdomen.  Summary  · A rib contusion is a deep bruise on your rib area. Contusions are the result of a blunt trauma that causes bleeding and injury to the tissues under the skin.  · The skin overlying the contusion may turn blue, purple, or yellow. Minor injuries may give you a painless contusion. More severe contusions may stay painful and swollen for a few weeks.  · Rest the injured area. Avoid strenuous activity and any activities or movements that cause pain.  This information is not intended to replace advice given to you by your health care provider. Make sure you discuss any questions you have with your health care provider.  Document Released: 09/12/2002 Document Revised: 01/16/2019 Document Reviewed: 01/16/2019  [employer] Patient Education © 2020 [employer]      Alcohol Problems  Most adults who drink alcohol drink in moderation (not a lot) are at low risk for developing problems related to their drinking. However, all drinkers, including low-risk drinkers, should know about the health risks connected with drinking alcohol.  RECOMMENDATIONS FOR LOW-RISK DRINKING   Drink in moderation. Moderate drinking is defined as follows:   · Men - no more than 2 drinks per day.  · Nonpregnant women - no more than 1 drink per day.  · Over age 65 - no more than 1 drink per day.  A standard drink is 12 grams of pure alcohol, which is equal to a 12 ounce bottle of beer or wine cooler, a 5 ounce glass of wine, or 1.5 ounces of distilled spirits (such as whiskey, sraah, vodka, or rum).   ABSTAIN FROM (DO NOT DRINK) ALCOHOL:  · When pregnant or considering pregnancy.  · When taking a medication that interacts with alcohol.  · If you are alcohol dependent.  · A medical condition that prohibits drinking alcohol (such as ulcer,  liver disease, or heart disease).  DISCUSS WITH YOUR CAREGIVER:  · If you are at risk for coronary heart disease, discuss the potential benefits and risks of alcohol use: Light to moderate drinking is associated with lower rates of coronary heart disease in certain populations (for example, men over age 45 and postmenopausal women). Infrequent or nondrinkers are advised not to begin light to moderate drinking to reduce the risk of coronary heart disease so as to avoid creating an alcohol-related problem. Similar protective effects can likely be gained through proper diet and exercise.  · Women and the elderly have smaller amounts of body water than men. As a result women and the elderly achieve a higher blood alcohol concentration after drinking the same amount of alcohol.  · Exposing a fetus to alcohol can cause a broad range of birth defects referred to as Fetal Alcohol Syndrome (FAS) or Alcohol-Related Birth Defects (ARBD). Although FAS/ARBD is connected with excessive alcohol consumption during pregnancy, studies also have reported neurobehavioral problems in infants born to mothers reporting drinking an average of 1 drink per day during pregnancy.  · Heavier drinking (the consumption of more than 4 drinks per occasion by men and more than 3 drinks per occasion by women) impairs learning (cognitive) and psychomotor functions and increases the risk of alcohol-related problems, including accidents and injuries.  CAGE QUESTIONS:   · Have you ever felt that you should Cut down on your drinking?  · Have people Annoyed you by criticizing your drinking?  · Have you ever felt bad or Guilty about your drinking?  · Have you ever had a drink first thing in the morning to steady your nerves or get rid of a hangover (Eye opener)?  If you answered positively to any of these questions: You may be at risk for alcohol-related problems if alcohol consumption is:   · Men: Greater than 14 drinks per week or more than 4 drinks per  occasion.  · Women: Greater than 7 drinks per week or more than 3 drinks per occasion.  Do you or your family have a medical history of alcohol-related problems, such as:  · Blackouts.  · Sexual dysfunction.  · Depression.  · Trauma.  · Liver dysfunction.  · Sleep disorders.  · Hypertension.  · Chronic abdominal pain.  · Has your drinking ever caused you problems, such as problems with your family, problems with your work (or school) performance, or accidents/injuries?  · Do you have a compulsion to drink or a preoccupation with drinking?  · Do you have poor control or are you unable to stop drinking once you have started?  · Do you have to drink to avoid withdrawal symptoms?  · Do you have problems with withdrawal such as tremors, nausea, sweats, or mood disturbances?  · Does it take more alcohol than in the past to get you high?  · Do you feel a strong urge to drink?  · Do you change your plans so that you can have a drink?  · Do you ever drink in the morning to relieve the shakes or a hangover?  If you have answered a number of the previous questions positively, it may be time for you to talk to your caregivers, family, and friends and see if they think you have a problem. Alcoholism is a chemical dependency that keeps getting worse and will eventually destroy your health and relationships. Many alcoholics end up dead, impoverished, or in detention. This is often the end result of all chemical dependency.  · Do not be discouraged if you are not ready to take action immediately.  · Decisions to change behavior often involve up and down desires to change and feeling like you cannot decide.  · Try to think more seriously about your drinking behavior.  · Think of the reasons to quit.  WHERE TO GO FOR ADDITIONAL INFORMATION   · The National [place] on Alcohol Abuse and Alcoholism (NIAAA)  www.niaaa.nih.gov  · National [place] on Alcoholism and Drug Dependence (NCADD)  www.ncadd.org  · American Society of Addiction  Medicine (ASAM)  www.asam.org   Document Released: 12/18/2006 Document Revised: 03/11/2013 Document Reviewed: 08/05/2009  [employer]® Patient Information ©2014 [email or web address].

## 2021-09-16 NOTE — HOSPITAL COURSE
"The patient states that he fell a couple of days ago and came to the ED at that time for evaluation.  Since then the patient has had severe right flank/back pain which the patient states he feels like he has a broken rib.  The patient says that the pain is more by moving, standing, and sometimes breathing.  The patient is also complaining of nausea/vomiting.  The patient is a heavy drinker.  The patient denies fever, chest pain, abdominal pain, headache or any other focal neurological deficit.     Initially there was a concern for blood in vomit, however the patient has not had an episode of bloody vomit while in the hospital.  We will continue to monitor for this and if it happens we will consult GI.      In the ER the patient had a white blood cell count of 11, platelet count of 127, sodium of 127, chloride of 85, CO2 of 19, glucose of 345, and a diagnostic alcohol level of 131.2.     The patient also had a CT of the chest abdomen and pelvis which reported: \"No acute inflammatory process in the chest, abdomen, or pelvis on this noncontrast CT. Punctate, nonobstructing bilateral renal stones. Smooth pleural thickening at the posterior right lower lobe measuring up to 1.6 cm.\"  "

## 2021-09-16 NOTE — PROGRESS NOTES
Received report from NOC RN. Assumed care of patient at 0700. Patient A&Ox 4, speaking in full sentences, follows commands and responds appropriately to questions. On room air, no signs of respiratory distress. Respirations are even and unlabored. Patient states 7/10 pain at this time. POC discussed and agreed upon with patient. Call light and belongings within reach. Bed in lowest locked position. Upper side rails raised. Fall risk precautions in place. Hourly rounding. Will continue to monitor labs.

## 2021-09-17 NOTE — CONSULTS
"Diabetes education: Pt has a hx of diabetes on Trulicity and metformin prior to admit. Pt was admitted with blood sugar of 345 and HgA1c not available.  Pt was on Regular insulin sliding scale coverage ac and hs with blood sugars of 207 ( 3 units), 222 ( 3 units ) and refused the 11 am finger stick.  Met with pt prior to discharge. Pt was anxious to discharge as he had an appointment in [place] at 3 pm. CDE sent text to RN and MD.  Reviewed diabetes, what effects blood sugars, effects of alcohol on blood sugars. Pt states he has his Trulicity and Metformin at home and does not want any \"educational\" handouts .  "